# Patient Record
Sex: FEMALE | Race: WHITE | Employment: FULL TIME | URBAN - METROPOLITAN AREA
[De-identification: names, ages, dates, MRNs, and addresses within clinical notes are randomized per-mention and may not be internally consistent; named-entity substitution may affect disease eponyms.]

---

## 2017-06-13 PROBLEM — N91.2 AMENORRHEA: Status: ACTIVE | Noted: 2017-06-13

## 2017-07-18 PROBLEM — E23.0 HYPOGONADOTROPIC HYPOGONADISM (HCC): Status: ACTIVE | Noted: 2017-07-18

## 2019-05-18 ENCOUNTER — HOSPITAL ENCOUNTER (EMERGENCY)
Age: 39
Discharge: ANOTHER HOSPITAL | End: 2019-05-18
Attending: EMERGENCY MEDICINE
Payer: COMMERCIAL

## 2019-05-18 ENCOUNTER — APPOINTMENT (OUTPATIENT)
Dept: GENERAL RADIOLOGY | Age: 39
End: 2019-05-18
Payer: COMMERCIAL

## 2019-05-18 ENCOUNTER — APPOINTMENT (OUTPATIENT)
Dept: CT IMAGING | Age: 39
End: 2019-05-18
Payer: COMMERCIAL

## 2019-05-18 ENCOUNTER — APPOINTMENT (OUTPATIENT)
Dept: CT IMAGING | Age: 39
DRG: 044 | End: 2019-05-18
Payer: COMMERCIAL

## 2019-05-18 ENCOUNTER — HOSPITAL ENCOUNTER (OUTPATIENT)
Age: 39
Discharge: HOME | End: 2019-05-18
Payer: COMMERCIAL

## 2019-05-18 ENCOUNTER — HOSPITAL ENCOUNTER (INPATIENT)
Age: 39
LOS: 3 days | Discharge: PSYCHIATRIC OR SUBSTANCE USE FACILITY | DRG: 044 | End: 2019-05-21
Attending: EMERGENCY MEDICINE | Admitting: INTERNAL MEDICINE
Payer: COMMERCIAL

## 2019-05-18 VITALS
OXYGEN SATURATION: 95 % | RESPIRATION RATE: 16 BRPM | HEART RATE: 89 BPM | TEMPERATURE: 98 F | SYSTOLIC BLOOD PRESSURE: 114 MMHG | DIASTOLIC BLOOD PRESSURE: 78 MMHG

## 2019-05-18 DIAGNOSIS — R41.82 ALTERED MENTAL STATUS, UNSPECIFIED ALTERED MENTAL STATUS TYPE: ICD-10-CM

## 2019-05-18 DIAGNOSIS — J96.01 ACUTE RESPIRATORY FAILURE WITH HYPOXIA AND HYPERCAPNIA (HCC): Primary | ICD-10-CM

## 2019-05-18 DIAGNOSIS — T50.904A DRUG OVERDOSE, UNDETERMINED INTENT, INITIAL ENCOUNTER: ICD-10-CM

## 2019-05-18 DIAGNOSIS — J96.02 ACUTE RESPIRATORY FAILURE WITH HYPOXIA AND HYPERCAPNIA (HCC): Primary | ICD-10-CM

## 2019-05-18 DIAGNOSIS — F19.10 DRUG ABUSE (HCC): Primary | ICD-10-CM

## 2019-05-18 DIAGNOSIS — S06.349A TRAUMATIC HEMORRHAGE OF RIGHT CEREBRUM WITH LOSS OF CONSCIOUSNESS, INITIAL ENCOUNTER (HCC): ICD-10-CM

## 2019-05-18 PROBLEM — I61.4 CEREBELLAR HEMORRHAGE, ACUTE (HCC): Status: ACTIVE | Noted: 2019-05-18

## 2019-05-18 LAB
ACETAMINOPHEN LEVEL: <5 MCG/ML (ref 10–30)
AMPHETAMINE SCREEN, URINE: NOT DETECTED
ANION GAP SERPL CALCULATED.3IONS-SCNC: 11 MMOL/L (ref 7–16)
B.E.: -1.1 MMOL/L (ref -3–3)
BARBITURATE SCREEN URINE: NOT DETECTED
BASOPHILS ABSOLUTE: 0.04 E9/L (ref 0–0.2)
BASOPHILS RELATIVE PERCENT: 0.4 % (ref 0–2)
BENZODIAZEPINE SCREEN, URINE: NOT DETECTED
BUN BLDV-MCNC: 8 MG/DL (ref 6–20)
CALCIUM SERPL-MCNC: 9.5 MG/DL (ref 8.6–10.2)
CANNABINOID SCREEN URINE: NOT DETECTED
CHLORIDE BLD-SCNC: 100 MMOL/L (ref 98–107)
CHP ED QC CHECK: NORMAL
CHP ED QC CHECK: NORMAL
CHP ED QC CHECK: YES
CO2: 29 MMOL/L (ref 22–29)
COCAINE METABOLITE SCREEN URINE: POSITIVE
COHB: 3.8 % (ref 0–1.5)
CREAT SERPL-MCNC: 0.6 MG/DL (ref 0.5–1)
CRITICAL: ABNORMAL
DATE ANALYZED: ABNORMAL
DATE OF COLLECTION: ABNORMAL
EKG ATRIAL RATE: 86 BPM
EKG P AXIS: 69 DEGREES
EKG P-R INTERVAL: 132 MS
EKG Q-T INTERVAL: 394 MS
EKG QRS DURATION: 88 MS
EKG QTC CALCULATION (BAZETT): 471 MS
EKG R AXIS: 83 DEGREES
EKG T AXIS: 50 DEGREES
EKG VENTRICULAR RATE: 86 BPM
EOSINOPHILS ABSOLUTE: 0.21 E9/L (ref 0.05–0.5)
EOSINOPHILS RELATIVE PERCENT: 2.1 % (ref 0–6)
ETHANOL: <10 MG/DL (ref 0–0.08)
GFR AFRICAN AMERICAN: >60
GFR NON-AFRICAN AMERICAN: >60 ML/MIN/1.73
GLUCOSE BLD-MCNC: 185 MG/DL
GLUCOSE BLD-MCNC: 67 MG/DL (ref 74–99)
GLUCOSE BLD-MCNC: 86 MG/DL
GLUCOSE BLD-MCNC: 99 MG/DL
HCG, URINE, POC: NEGATIVE
HCO3: 26 MMOL/L (ref 22–26)
HCT VFR BLD CALC: 41 % (ref 34–48)
HEMOGLOBIN: 13.8 G/DL (ref 11.5–15.5)
HHB: 1.3 % (ref 0–5)
IMMATURE GRANULOCYTES #: 0.03 E9/L
IMMATURE GRANULOCYTES %: 0.3 % (ref 0–5)
LAB: ABNORMAL
LYMPHOCYTES ABSOLUTE: 4.13 E9/L (ref 1.5–4)
LYMPHOCYTES RELATIVE PERCENT: 41.7 % (ref 20–42)
Lab: ABNORMAL
Lab: NORMAL
MCH RBC QN AUTO: 31.7 PG (ref 26–35)
MCHC RBC AUTO-ENTMCNC: 33.7 % (ref 32–34.5)
MCV RBC AUTO: 94 FL (ref 80–99.9)
METER GLUCOSE: 185 MG/DL (ref 74–99)
METER GLUCOSE: 86 MG/DL (ref 74–99)
METER GLUCOSE: 99 MG/DL (ref 74–99)
METHADONE SCREEN, URINE: NOT DETECTED
METHB: 0.3 % (ref 0–1.5)
MODE: ABNORMAL
MONOCYTES ABSOLUTE: 0.75 E9/L (ref 0.1–0.95)
MONOCYTES RELATIVE PERCENT: 7.6 % (ref 2–12)
NEGATIVE QC PASS/FAIL: NORMAL
NEUTROPHILS ABSOLUTE: 4.75 E9/L (ref 1.8–7.3)
NEUTROPHILS RELATIVE PERCENT: 47.9 % (ref 43–80)
O2 CONTENT: 18.4 ML/DL
O2 SATURATION: 98.6 % (ref 92–98.5)
O2HB: 94.6 % (ref 94–97)
OPERATOR ID: 5721
OPIATE SCREEN URINE: NOT DETECTED
PATIENT TEMP: 37 C
PCO2: 53.2 MMHG (ref 35–45)
PDW BLD-RTO: 12.7 FL (ref 11.5–15)
PH BLOOD GAS: 7.31 (ref 7.35–7.45)
PHENCYCLIDINE SCREEN URINE: NOT DETECTED
PLATELET # BLD: 360 E9/L (ref 130–450)
PMV BLD AUTO: 9.5 FL (ref 7–12)
PO2: 174.2 MMHG (ref 60–100)
POSITIVE QC PASS/FAIL: NORMAL
POTASSIUM SERPL-SCNC: 3.8 MMOL/L (ref 3.5–5)
PROPOXYPHENE SCREEN: NOT DETECTED
RBC # BLD: 4.36 E12/L (ref 3.5–5.5)
SALICYLATE, SERUM: <0.3 MG/DL (ref 0–30)
SODIUM BLD-SCNC: 140 MMOL/L (ref 132–146)
SOURCE, BLOOD GAS: ABNORMAL
THB: 13.6 G/DL (ref 11.5–16.5)
TIME ANALYZED: 1034
TOTAL CK: 129 U/L (ref 20–180)
TRICYCLIC ANTIDEPRESSANTS SCREEN SERUM: NEGATIVE NG/ML
TROPONIN: <0.01 NG/ML (ref 0–0.03)
WBC # BLD: 9.9 E9/L (ref 4.5–11.5)

## 2019-05-18 PROCEDURE — A0426 ALS 1: HCPCS

## 2019-05-18 PROCEDURE — 72125 CT NECK SPINE W/O DYE: CPT

## 2019-05-18 PROCEDURE — G0480 DRUG TEST DEF 1-7 CLASSES: HCPCS

## 2019-05-18 PROCEDURE — 99285 EMERGENCY DEPT VISIT HI MDM: CPT

## 2019-05-18 PROCEDURE — 36415 COLL VENOUS BLD VENIPUNCTURE: CPT

## 2019-05-18 PROCEDURE — 80048 BASIC METABOLIC PNL TOTAL CA: CPT

## 2019-05-18 PROCEDURE — 84484 ASSAY OF TROPONIN QUANT: CPT

## 2019-05-18 PROCEDURE — 2580000003 HC RX 258: Performed by: EMERGENCY MEDICINE

## 2019-05-18 PROCEDURE — 93005 ELECTROCARDIOGRAM TRACING: CPT | Performed by: STUDENT IN AN ORGANIZED HEALTH CARE EDUCATION/TRAINING PROGRAM

## 2019-05-18 PROCEDURE — 85025 COMPLETE CBC W/AUTO DIFF WBC: CPT

## 2019-05-18 PROCEDURE — 82962 GLUCOSE BLOOD TEST: CPT

## 2019-05-18 PROCEDURE — 87081 CULTURE SCREEN ONLY: CPT

## 2019-05-18 PROCEDURE — 80307 DRUG TEST PRSMV CHEM ANLYZR: CPT

## 2019-05-18 PROCEDURE — 2580000003 HC RX 258: Performed by: NURSE PRACTITIONER

## 2019-05-18 PROCEDURE — 82550 ASSAY OF CK (CPK): CPT

## 2019-05-18 PROCEDURE — 71045 X-RAY EXAM CHEST 1 VIEW: CPT

## 2019-05-18 PROCEDURE — 70450 CT HEAD/BRAIN W/O DYE: CPT

## 2019-05-18 PROCEDURE — A0425 GROUND MILEAGE: HCPCS

## 2019-05-18 PROCEDURE — 93010 ELECTROCARDIOGRAM REPORT: CPT | Performed by: INTERNAL MEDICINE

## 2019-05-18 PROCEDURE — 93005 ELECTROCARDIOGRAM TRACING: CPT | Performed by: EMERGENCY MEDICINE

## 2019-05-18 PROCEDURE — 6360000002 HC RX W HCPCS: Performed by: NURSE PRACTITIONER

## 2019-05-18 PROCEDURE — 99222 1ST HOSP IP/OBS MODERATE 55: CPT | Performed by: NEUROLOGICAL SURGERY

## 2019-05-18 PROCEDURE — 2580000003 HC RX 258: Performed by: STUDENT IN AN ORGANIZED HEALTH CARE EDUCATION/TRAINING PROGRAM

## 2019-05-18 PROCEDURE — 2060000000 HC ICU INTERMEDIATE R&B

## 2019-05-18 PROCEDURE — 6360000002 HC RX W HCPCS: Performed by: STUDENT IN AN ORGANIZED HEALTH CARE EDUCATION/TRAINING PROGRAM

## 2019-05-18 PROCEDURE — 96374 THER/PROPH/DIAG INJ IV PUSH: CPT

## 2019-05-18 PROCEDURE — 96375 TX/PRO/DX INJ NEW DRUG ADDON: CPT

## 2019-05-18 PROCEDURE — 2500000003 HC RX 250 WO HCPCS: Performed by: NURSE PRACTITIONER

## 2019-05-18 PROCEDURE — 82805 BLOOD GASES W/O2 SATURATION: CPT

## 2019-05-18 RX ORDER — LORAZEPAM 2 MG/ML
1 INJECTION INTRAMUSCULAR ONCE
Status: COMPLETED | OUTPATIENT
Start: 2019-05-18 | End: 2019-05-18

## 2019-05-18 RX ORDER — ALBUTEROL SULFATE 2.5 MG/3ML
2.5 SOLUTION RESPIRATORY (INHALATION) EVERY 6 HOURS PRN
Status: DISCONTINUED | OUTPATIENT
Start: 2019-05-18 | End: 2019-05-21 | Stop reason: HOSPADM

## 2019-05-18 RX ORDER — NALOXONE HYDROCHLORIDE 0.4 MG/ML
0.4 INJECTION, SOLUTION INTRAMUSCULAR; INTRAVENOUS; SUBCUTANEOUS ONCE
Status: DISCONTINUED | OUTPATIENT
Start: 2019-05-18 | End: 2019-05-18

## 2019-05-18 RX ORDER — LABETALOL HYDROCHLORIDE 5 MG/ML
10 INJECTION, SOLUTION INTRAVENOUS EVERY 10 MIN PRN
Status: DISCONTINUED | OUTPATIENT
Start: 2019-05-18 | End: 2019-05-19

## 2019-05-18 RX ORDER — HYDRALAZINE HYDROCHLORIDE 20 MG/ML
10 INJECTION INTRAMUSCULAR; INTRAVENOUS EVERY 10 MIN PRN
Status: DISCONTINUED | OUTPATIENT
Start: 2019-05-18 | End: 2019-05-19

## 2019-05-18 RX ORDER — 0.9 % SODIUM CHLORIDE 0.9 %
1000 INTRAVENOUS SOLUTION INTRAVENOUS ONCE
Status: COMPLETED | OUTPATIENT
Start: 2019-05-18 | End: 2019-05-18

## 2019-05-18 RX ORDER — ONDANSETRON 2 MG/ML
4 INJECTION INTRAMUSCULAR; INTRAVENOUS EVERY 6 HOURS PRN
Status: DISCONTINUED | OUTPATIENT
Start: 2019-05-18 | End: 2019-05-21 | Stop reason: HOSPADM

## 2019-05-18 RX ORDER — DIPHENHYDRAMINE HYDROCHLORIDE 50 MG/ML
12.5 INJECTION INTRAMUSCULAR; INTRAVENOUS ONCE
Status: COMPLETED | OUTPATIENT
Start: 2019-05-18 | End: 2019-05-18

## 2019-05-18 RX ORDER — SODIUM CHLORIDE 0.9 % (FLUSH) 0.9 %
10 SYRINGE (ML) INJECTION PRN
Status: DISCONTINUED | OUTPATIENT
Start: 2019-05-18 | End: 2019-05-21 | Stop reason: HOSPADM

## 2019-05-18 RX ORDER — DEXTROSE MONOHYDRATE 25 G/50ML
12.5 INJECTION, SOLUTION INTRAVENOUS ONCE
Status: COMPLETED | OUTPATIENT
Start: 2019-05-18 | End: 2019-05-18

## 2019-05-18 RX ORDER — SODIUM CHLORIDE 0.9 % (FLUSH) 0.9 %
10 SYRINGE (ML) INJECTION EVERY 12 HOURS SCHEDULED
Status: DISCONTINUED | OUTPATIENT
Start: 2019-05-18 | End: 2019-05-21 | Stop reason: HOSPADM

## 2019-05-18 RX ADMIN — SODIUM CHLORIDE 1000 ML: 9 INJECTION, SOLUTION INTRAVENOUS at 07:35

## 2019-05-18 RX ADMIN — THIAMINE HYDROCHLORIDE: 100 INJECTION, SOLUTION INTRAMUSCULAR; INTRAVENOUS at 12:45

## 2019-05-18 RX ADMIN — LORAZEPAM 1 MG: 2 INJECTION INTRAMUSCULAR; INTRAVENOUS at 07:35

## 2019-05-18 RX ADMIN — SODIUM CHLORIDE 1000 ML: 9 INJECTION, SOLUTION INTRAVENOUS at 11:36

## 2019-05-18 RX ADMIN — DEXTROSE 50 % IN WATER (D50W) INTRAVENOUS SYRINGE 12.5 G: at 08:39

## 2019-05-18 RX ADMIN — DIPHENHYDRAMINE HYDROCHLORIDE 12.5 MG: 50 INJECTION, SOLUTION INTRAMUSCULAR; INTRAVENOUS at 07:35

## 2019-05-18 ASSESSMENT — PAIN SCALES - GENERAL
PAINLEVEL_OUTOF10: 0

## 2019-05-18 NOTE — ED PROVIDER NOTES
[id number]      Department of Emergency Medicine   ED  Provider Note  Admit Date/RoomTime: 5/18/2019  9:37 AM  ED Room: 23/23          History of Present Illness:  5/18/19, Time: 9:54 AM         Ivory Centeno is a 45 y.o. female presenting to the ED for possible overdose and intracranial hemorrhage, beginning prior to arrival.  The complaint has been persistent, moderate in severity, and worsened by nothing. The patient presented to [facility] after a possible overdose. She was unresponsive. Testing showed UDS + for cocaine. CT head showed cerebellar hemorrhage. No trauma. Transferred here for further evaluation. She was given benadryl and ativan prior to arrival and prior to transfer. On arrival she is sleepy, moans and opens eyes to painful stimuli and moving all extremities to painful stimuli. Review of Systems:   Pertinent positives and negatives are stated within HPI, all other systems reviewed and are negative.        --------------------------------------------- PAST HISTORY ---------------------------------------------  Past Medical History:  has a past medical history of Asthma and Thyroid disease. Past Surgical History:  has a past surgical history that includes Dental surgery (5/2/2012); cyst removal; and Dilation and curettage of uterus. Social History:  reports that she has been smoking cigarettes. She has been smoking about 1.00 pack per day. She does not have any smokeless tobacco history on file. She reports that she drinks alcohol. She reports that she does not use drugs. Family History: family history is not on file. The patients home medications have been reviewed. Allergies: Patient has no known allergies. ---------------------------------------------------PHYSICAL EXAM--------------------------------------    Constitutional/General: sedated, opens eyes to painful stimuli.    Head: Normocephalic and atraumatic  Eyes: PERRL, EOMI, conjunctiva normal, sclera non Glucose   05/18/19 08:56:00 05/18/19 08:59:00 185High    Previous Results   05/18/19 07:30:00 05/18/19 07:33:00 99       Final result                        POCT Glucose (Final result)    Component (Lab Inquiry)   Collection Time Result Time GLUCOSE QC OK?    05/18/19 07:30:00 99 ok   Previous Results   05/18/19 07:17:00 05/18/19 07:54:00 67Low           Final result                          POCT Glucose (Final result)    Component (Lab Inquiry)   Collection Time Result Time Meter Glucose   05/18/19 07:30:00 05/18/19 07:33:00 99         Final result                          Contains abnormal data CBC Auto Differential (Final result)    Component (Lab Inquiry)   Collection Time Result Time WBC RBC HGB HCT MCV MCH MCHC RDW PLT MPV   05/18/19 07:17:00 05/18/19 07:31:00 9.9 4.36 13.8 41.0 94.0 31.7 33.7 12.7 360 9.5       Collection Time Result Time NEUTRO PCT Immature Granulocytes % LYMPHO PCT MONO PCT EOS BASOS PCT NEUTRO ABS Immature Granulocytes # LYMPHS ABS MONOS ABS   05/18/19 07:17:00 05/18/19 07:31:00 47.9 0.3 41.7 7.6 2.1 0.4 4.75 0.03 4. 13High  0.75       Collection Time Result Time EOS ABS BASOS ABS   05/18/19 07:17:00 05/18/19 07:31:00 0.21 0.04       Final result                        Contains abnormal data Basic Metabolic Panel (Final result)    Component (Lab Inquiry)   Collection Time Result Time NA K CL CO2 ANION GAP GLUCOSE BUN CREATININE GFR Non- GFR    05/18/19 07:17:00 05/18/19 07:54:00 140 3.8 100 29 11 67Low  8 0.6 >60  Chronic Kidney Disease. .. >60       Collection Time Result Time CALCIUM   05/18/19 07:17:00 05/18/19 07:54:00 9.5       Final result                        Troponin (Final result)    Component (Lab Inquiry)   Collection Time Result Time Troponin   05/18/19 07:17:00 05/18/19 07:54:00 <0.01  TROPONIN T BLOOD LEVEL. ..         Final result                          Contains abnormal data Urine Drug Screen (Final result)    Component (Lab Inquiry) Collection Time Result Time Amphetamine Screen, Urine Barbiturate Screen, Ur Benzodiazepine Screen, Urine Cannabinoid Scrn, Ur Cocaine Metabolite Screen, Urine Opiate Scrn, Ur PCP Screen, Urine Methadone Screen, Urine Propoxyphene Scrn, Ur   19 07:17:00 19 08:23:00 NOT DETECTED NOT DETECTED NOT DETECTED NOT DETECTED POSITIVEAbnormal  NOT DETECTED  Note: The Opiate Scre. .. NOT DETECTED  A urine drug screen as. .. NOT DETECTED NOT DETECTED       Final result                        Contains abnormal data Serum Drug Screen (Preliminary result)    Component (Lab Inquiry)   Collection Time Result Time Ethanol Lvl ACTMNPHEN Salicylate, Serum   39/ 07:17:00 19 07:52:00 <10  Not Detected <5.0Low  <0.3           POC Pregnancy Urine Qual (Final result)    Component (Lab Inquiry)   Collection Time Result Time pochcgu lotnum posqcpassfail negqcpassfail   19 07:14:00 19 07:14:00 Negative [id number] Pass Pass         Final result                      Micro Results This Visit     None   Imaging Results         XR CHEST PORTABLE (Final result)   Result time 19 09:29:42   Final result by Gee Linares MD (19 09:29:42)                Impression:    normal chest                    Narrative:    Patient MRN: [id number]  : 1980  Age:  40 years  Gender: Female  Order Date: 2019 7:00 AM  Exam: XR CHEST PORTABLE  Number of Images: 1 view  Indication:   altered mental status, suspected drug overdose   altered mental status, suspected drug overdose  Comparison: None. Findings: The heart is unremarkable. The lung fields are unremarkable. The aorta is unremarkable.                    CT Cervical Spine WO Contrast (Final result)   Result time 19 09:11:58   Final result by Suman Lugo MD (19 09:11:58)                Impression:    Spondylotic change at the C5-C6 interspace level.   Otherwise normal.            Narrative:    Patient MRN:  [id number]  : 1980  Age: 45 years  Gender: Female  Order Date:  2019 8:30 AM  EXAM: CT CERVICAL SPINE WO CONTRAST  NUMBER OF IMAGES: 590  INDICATION:  altered mental status   COMPARISON: None    Multiple CT sections were obtained with sagittal and coronal MPR  reconstructions. Technique: Low-dose CT  acquisition technique included one of  following options; 1 . Automated exposure control, 2. Adjustment of MA  and or KV according to patient's size or 3. Use of iterative  reconstruction. Comparison: None. FINDINGS: There is no evidence of fracture or subluxation. No  suspicious bony lesion is noted. The craniocervical junction and the  C1-C2 articulation normal.    At the C5-C6 interspace level, mild to moderate disc space narrowing  is seen. Changes of mild diffuse concentric disc bulging ridging are  noted, slightly compromising the spinal canal and neural foramina;  exacerbation by minimal bilateral uncinate joint hypertrophy is noted. Posterior elements are normal.    Other disc spaces are preserved in height spinal canal is otherwise  widely patent. No regional hematoma, enlarged lymph node, or other abnormality can be  identified. Visualized portions of the lung apices appear clear. .                    CT Head WO Contrast (Final result)   Result time 19 08:18:51   Final result by Indiana Higginbotham MD (19 08:18:51)                Impression:    Findings suspicious for a small hemorrhage at the level  the right cerebellum  ALERT: Sergio Raw    Results were called to Dr. Savannah Guevara at the time of dictation            Narrative:    Patient MRN:  [id number]  : 1980  Age: 45 years  Gender: Female  Order Date:  2019 7:00 AM  EXAM: CT HEAD WO CONTRAST  NUMBER OF IMAGES:  156  INDICATION:  altered mental status, suspected drug overdose   COMPARISON: None    Technique: Low-dose CT  acquisition technique included one of  following options; 1 . Automated exposure control, 2.  Adjustment of present              Bambi Halsted, MD  05/18/19 1125
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
## 2019-05-18 NOTE — ED NOTES
Bed: 23  Expected date:   Expected time:   Means of arrival:   Comments:     Lisandra Huerta RN  05/18/19 9424
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
## 2019-05-18 NOTE — H&P
Hospital Medicine History & Physical      PCP: Rhea Almaraz MD    Date of Admission: 5/18/2019    Date of Service: Pt seen/examined on 5/18/2019 and Admitted to Inpatient with expected LOS greater than two midnights due to medical therapy. Chief Complaint: AMS    History Of Present Illness:      Pt with a hx of asthma and thyroid disease who initially presented to [facility] ED for AMS following suspected overdose. Pt UDS showed cocaine and CT head showed cerebellar bleed. Pt became agitated and given benadryl and ativan and then transferred to our ED. Pt was sleepy but does awake to sternal rub. Unclear if patient took additional mediation with cocaine. Pt admitted to NSICU for further observation. Past Medical History:          Diagnosis Date    Asthma     Thyroid disease        Past Surgical History:          Procedure Laterality Date    CYST REMOVAL      on thyroid    DENTAL SURGERY  5/2/2012    upper teeth extraction    DILATION AND CURETTAGE OF UTERUS         Medications Prior to Admission:      Prior to Admission medications    Medication Sig Start Date End Date Taking? Authorizing Provider   albuterol (PROVENTIL) (2.5 MG/3ML) 0.083% nebulizer solution Take 3 mLs by nebulization every 6 hours as needed for Wheezing 11/20/15   GIRMA Merchant CNP   Respiratory Therapy Supplies (NEBULIZER/TUBING/MOUTHPIECE) KIT 1 kit by Does not apply route daily as needed 11/20/15   GIRMA Merchant CNP   amphetamine-dextroamphetamine (ADDERALL XR) 10 MG XR capsule Take 20 mg by mouth 2 times daily. Historical Provider, MD   Zolpidem Tartrate (AMBIEN PO) Take  by mouth. Historical Provider, MD   alprazolam Ahmed Cordon) 0.25 MG tablet Take 1 mg by mouth 3 times daily as needed. Historical Provider, MD   BuPROPion HCl (WELLBUTRIN SR PO) Take 200 mg by mouth 2 times daily. Historical Provider, MD       Allergies:  Patient has no known allergies.     Social History:      The patient currently lives at home    TOBACCO:   reports that she has been smoking cigarettes. She has been smoking about 1.00 pack per day. She does not have any smokeless tobacco history on file. ETOH:   reports that she drinks alcohol. Family History:      Reviewed in detail and negative for DM, CAD, Cancer, CVA. Positive as follows:    History reviewed. No pertinent family history. REVIEW OF SYSTEMS:   Pertinent positives as noted in the HPI. All other systems reviewed and negative. PHYSICAL EXAM:    BP 96/63   Pulse 68   Temp 98.3 °F (36.8 °C) (Oral)   Resp 15   Ht 5' 6\" (1.676 m)   Wt 115 lb (52.2 kg)   SpO2 100%   BMI 18.56 kg/m²     General appearance:  Sleepy but arousable with sternal rub. HEENT:  Normal cephalic, atraumatic without obvious deformity. Pupils equal, round, and reactive to light. Extra ocular muscles intact. Conjunctivae/corneas clear. Neck: Supple, with full range of motion. No jugular venous distention. Trachea midline. Respiratory:  Normal respiratory effort. Clear to auscultation, bilaterally without Rales/Wheezes/Rhonchi. Cardiovascular:  Regular rate and rhythm with normal S1/S2 without murmurs, rubs or gallops. Abdomen: Soft, non-tender, non-distended with normal bowel sounds. Musculoskeletal:  No clubbing, cyanosis or edema bilaterally. Full range of motion without deformity. Skin: Skin color, texture, turgor normal.  No rashes or lesions. Neurologic:  Sleepy, arousable. Oriented x 2    Labs:     No results for input(s): WBC, HGB, HCT, PLT in the last 72 hours. No results for input(s): NA, K, CL, CO2, BUN, CREATININE, CALCIUM, PHOS in the last 72 hours. Invalid input(s): MAGNES  No results for input(s): AST, ALT, BILIDIR, BILITOT, ALKPHOS in the last 72 hours. No results for input(s): INR in the last 72 hours.   Recent Labs     05/18/19  1005   CKTOTAL 129       Urinalysis:      Lab Results   Component Value Date    NITRU NEGATIVE 08/05/2011    WBCUA >20 08/05/2011 BACTERIA MODERATE 08/05/2011    RBCUA NONE 08/05/2011    BLOODU NEGATIVE 08/05/2011    SPECGRAV >=1.030 08/05/2011    GLUCOSEU NEGATIVE 08/05/2011         ASSESSMENT:  Acute toxic encephalopathy  Cerebellar hemorrhage  Cocaine use    PLAN:  Repeat head CT, consult NSG  BP control <   Watch respiratory status closely  NPO until more alert for diet  Pain control    DVT Prophylaxis: SCD  Diet: DIET GENERAL;  Code Status: Full Code    PT/OT Eval Status: Jhonny Mcgregor MD    Thank you Glenda Cruz MD for the opportunity to be involved in this patient's care. If you have any questions or concerns please feel free to contact me at [phone].

## 2019-05-18 NOTE — PROGRESS NOTES
Not on file    Food insecurity:     Worry: Not on file     Inability: Not on file    Transportation needs:     Medical: Not on file     Non-medical: Not on file   Tobacco Use    Smoking status: Current Some Day Smoker     Packs/day: 1.00     Types: Cigarettes   Substance and Sexual Activity    Alcohol use: Yes     Comment: rare    Drug use: No    Sexual activity: Not on file   Lifestyle    Physical activity:     Days per week: Not on file     Minutes per session: Not on file    Stress: Not on file   Relationships    Social connections:     Talks on phone: Not on file     Gets together: Not on file     Attends Faith service: Not on file     Active member of club or organization: Not on file     Attends meetings of clubs or organizations: Not on file     Relationship status: Not on file    Intimate partner violence:     Fear of current or ex partner: Not on file     Emotionally abused: Not on file     Physically abused: Not on file     Forced sexual activity: Not on file   Other Topics Concern    Not on file   Social History Narrative    Not on file       Family History:   History reviewed. No pertinent family history. VITAL SIGNS:   /67   Pulse 70   Temp 98.3 °F (36.8 °C) (Oral)   Resp 23   Ht 5' 6\" (1.676 m)   Wt 115 lb (52.2 kg)   SpO2 100%   BMI 18.56 kg/m²     PHYSICAL EXAM:    General appearance:  Comfortable. Pain Description: none  GCS:    2 - Opens eyes with pain   6 - Follows simple motor commands  4 - Seems confused, disoriented    Pupil size:  Left 3 mm    Right 3 mm  Pupil reaction: Yes  Wiggles fingers: Left Yes Right Yes  Hand grasp:   Left decreased    Right decreased  Wiggles toes: Left Yes    Right Yes  Plantar flexion: Left decreased   Right decreased    CONSTITUTIONAL: no acute distress, lying in hospital bed. Lethargic, catechetic   CARDIOVASCULAR: S1 S2, regular rate, regular rhythm, no murmur/gallop/rub.  Monitor:sinus  PULMONARY: no rhonchi/rales/wheezes, no use of accessory muscles  RENAL: fischer to gravity, clear yellow urine  ABDOMEN: soft, nontender, nondistended, nontympanic, no masses, no organomegaly, normal bowel sounds   MUSCULOSKELETAL: moves all extremities purposefully  SKIN/EXTREMITIES: no rashes/ecchymosis, no edema/clubbing, warm/dry, good capillary refill     Assessment & Plan:       · Neuro:  Suspected ICH, Cocaine use, Hx substance abuse, anxiety. Neurosurgery following, repeat CT scan, monitor neuro status, monitor for withdrawal   · CV: No acute issues. Monitor hemodynamics. BP goal < 140. PRN hydralzine & labetalol. · Pulm: High risk for espiratory insufficiency, Hx asthma. Monitor RR & SpO2. Albuterol. · GI: Protein calorie malnutrition. NPO until more alert for swallow eval.  Monitor bowel function. Dietitian eval, IV MVI  · Renal: No acute issues. Monitor BUN & Cr. Monitor electrolytes & replace as needed. Monitor urine output. · ID: No acute issues   · Endocrine: No acute issues. Hx  Hypothyroid. Check TSH  · MSK: No acute issues. · Heme: No acute issues. Anemia. Monitor CBC. Bowel regime:  MOM   Pain control/Sedation:  Tylenol  DVT prophylaxis:  Lovenox/heparin. No Lovenox/heparin until ok with neurosurgery. Mouth/Eye care: As needed  Fischer: Keep in place for critical care monitoring of fluid balance.     Ancillary consults: Neurosurgery, Medicine   Family update: Updated at bedside   Code status:  Full    Disposition:  NSICU    Electronically signed by GIRMA Malloy CNP on 5/18/2019 at 1:17 PM

## 2019-05-18 NOTE — CONSULTS
[address]                  [address] [facility]                                  CONSULTATION    PATIENT NAME: Nguyen Malik                   :        1980  MED REC NO:   [id number]                            ROOM:       4585  ACCOUNT NO:   [de-identified]                           ADMIT DATE: 2019  PROVIDER:     José Antonio Ramos MD    CONSULT DATE:  2019    REASON FOR CONSULT:  1. Cerebellar hemorrhage. 2.  Possible cerebellar calcification. HISTORY OF PRESENT ILLNESS:  The patient is a 35-year-old lady who  presented to the emergency room at [address] [place] [address] after being  found in a car at a gas station with slow respiration and decreased  responsiveness. She was given Narcan and was subsequently taken to the  hospital.  She was diagnosed with an intentional overdose as there was a  pill bottle found in the car with her. She was ultimately transported  to [facility] in [facility] after she had a CT scan of her  head that showed possible cerebellar hemorrhage. Neurosurgery service  was consulted. PAST MEDICAL HISTORY:  Unknown. PAST SURGICAL HISTORY:  Unknown. FAMILY HISTORY:  Unknown. SOCIAL HISTORY:  Unknown. ALLERGIES:  Unknown. REVIEW OF SYSTEMS:  I am unable to complete a 14-point review of systems  because of the patient's current medical condition. PHYSICAL EXAMINATION:  VITAL SIGNS:  She is currently afebrile with a T-current of 36.7 degrees  Celsius, respiratory rate is 18, pulse 88, blood pressure is 112/68. GENERAL:  She is resting in bed. She is sleepy, but arousable with the  application of noxious stimuli. NEUROLOGIC:  Again, due to the fact that she is still pharmacologically  inhibited as a result of her attempted overdose, rest of the examination  is difficult to complete.   With the application of noxious stimuli, she  will localize in her bilateral upper

## 2019-05-18 NOTE — ED NOTES
Bed: 28  Expected date:   Expected time:   Means of arrival:   Comments:  EMS overdose     Cory Isaac [facility]  05/18/19 0254

## 2019-05-19 LAB
ANION GAP SERPL CALCULATED.3IONS-SCNC: 10 MMOL/L (ref 7–16)
BUN BLDV-MCNC: 9 MG/DL (ref 6–20)
CALCIUM SERPL-MCNC: 9.3 MG/DL (ref 8.6–10.2)
CHLORIDE BLD-SCNC: 104 MMOL/L (ref 98–107)
CO2: 24 MMOL/L (ref 22–29)
CREAT SERPL-MCNC: 0.7 MG/DL (ref 0.5–1)
EKG ATRIAL RATE: 85 BPM
EKG P AXIS: 70 DEGREES
EKG P-R INTERVAL: 124 MS
EKG Q-T INTERVAL: 398 MS
EKG QRS DURATION: 88 MS
EKG QTC CALCULATION (BAZETT): 473 MS
EKG R AXIS: 82 DEGREES
EKG T AXIS: 57 DEGREES
EKG VENTRICULAR RATE: 85 BPM
GFR AFRICAN AMERICAN: >60
GFR NON-AFRICAN AMERICAN: >60 ML/MIN/1.73
GLUCOSE BLD-MCNC: 84 MG/DL (ref 74–99)
HCT VFR BLD CALC: 40 % (ref 34–48)
HEMOGLOBIN: 13.1 G/DL (ref 11.5–15.5)
MAGNESIUM: 2 MG/DL (ref 1.6–2.6)
MCH RBC QN AUTO: 31 PG (ref 26–35)
MCHC RBC AUTO-ENTMCNC: 32.8 % (ref 32–34.5)
MCV RBC AUTO: 94.6 FL (ref 80–99.9)
MRSA CULTURE ONLY: NORMAL
PDW BLD-RTO: 13.4 FL (ref 11.5–15)
PHOSPHORUS: 3.4 MG/DL (ref 2.5–4.5)
PLATELET # BLD: 329 E9/L (ref 130–450)
PMV BLD AUTO: 9.9 FL (ref 7–12)
POTASSIUM SERPL-SCNC: 3.8 MMOL/L (ref 3.5–5)
RBC # BLD: 4.23 E12/L (ref 3.5–5.5)
SODIUM BLD-SCNC: 138 MMOL/L (ref 132–146)
TOTAL CK: 83 U/L (ref 20–180)
WBC # BLD: 7 E9/L (ref 4.5–11.5)

## 2019-05-19 PROCEDURE — 84100 ASSAY OF PHOSPHORUS: CPT

## 2019-05-19 PROCEDURE — 2060000000 HC ICU INTERMEDIATE R&B

## 2019-05-19 PROCEDURE — 83735 ASSAY OF MAGNESIUM: CPT

## 2019-05-19 PROCEDURE — 82550 ASSAY OF CK (CPK): CPT

## 2019-05-19 PROCEDURE — 80048 BASIC METABOLIC PNL TOTAL CA: CPT

## 2019-05-19 PROCEDURE — 97161 PT EVAL LOW COMPLEX 20 MIN: CPT

## 2019-05-19 PROCEDURE — 97165 OT EVAL LOW COMPLEX 30 MIN: CPT

## 2019-05-19 PROCEDURE — 2580000003 HC RX 258: Performed by: INTERNAL MEDICINE

## 2019-05-19 PROCEDURE — 36415 COLL VENOUS BLD VENIPUNCTURE: CPT

## 2019-05-19 PROCEDURE — 85027 COMPLETE CBC AUTOMATED: CPT

## 2019-05-19 PROCEDURE — 97535 SELF CARE MNGMENT TRAINING: CPT

## 2019-05-19 PROCEDURE — 6370000000 HC RX 637 (ALT 250 FOR IP): Performed by: INTERNAL MEDICINE

## 2019-05-19 PROCEDURE — 99232 SBSQ HOSP IP/OBS MODERATE 35: CPT | Performed by: NEUROLOGICAL SURGERY

## 2019-05-19 RX ORDER — HYDRALAZINE HYDROCHLORIDE 20 MG/ML
10 INJECTION INTRAMUSCULAR; INTRAVENOUS EVERY 4 HOURS PRN
Status: DISCONTINUED | OUTPATIENT
Start: 2019-05-19 | End: 2019-05-21 | Stop reason: HOSPADM

## 2019-05-19 RX ORDER — ALBUTEROL SULFATE 2.5 MG/3ML
2.5 SOLUTION RESPIRATORY (INHALATION) EVERY 6 HOURS PRN
Status: CANCELLED | OUTPATIENT
Start: 2019-05-19

## 2019-05-19 RX ORDER — LABETALOL HYDROCHLORIDE 5 MG/ML
10 INJECTION, SOLUTION INTRAVENOUS EVERY 4 HOURS PRN
Status: DISCONTINUED | OUTPATIENT
Start: 2019-05-19 | End: 2019-05-21 | Stop reason: HOSPADM

## 2019-05-19 RX ORDER — HYDROXYZINE PAMOATE 25 MG/1
25 CAPSULE ORAL EVERY 4 HOURS PRN
Status: DISCONTINUED | OUTPATIENT
Start: 2019-05-19 | End: 2019-05-21 | Stop reason: HOSPADM

## 2019-05-19 RX ADMIN — HYDROXYZINE PAMOATE 25 MG: 25 CAPSULE ORAL at 19:28

## 2019-05-19 RX ADMIN — Medication 10 ML: at 19:28

## 2019-05-19 RX ADMIN — Medication 10 ML: at 08:38

## 2019-05-19 ASSESSMENT — PAIN SCALES - GENERAL
PAINLEVEL_OUTOF10: 0

## 2019-05-19 NOTE — PLAN OF CARE
Problem: Inadequate oral food/beverage intake (NI-2.1)  Goal: Food and/or Nutrient Delivery  Magic Cup BID  Description  Individualized approach for food/nutrient provision.   Outcome: Met This Shift

## 2019-05-19 NOTE — PROGRESS NOTES
Occupational Therapy  OCCUPATIONAL THERAPY INITIAL EVALUATION      Date:2019  Patient Name: Annemarie Last  MRN: [id number]  : 1980  Room: 8506/850-A     Evaluating OT: Virgilio Lan OTR/L [id number]  AM-PAC Daily Activity Raw Score:   Recommended Adaptive Equipment: TBA    Comments: Based on patient's functional performance as documented below and prior level of function, patient would benefit from continued skilled OT during/following hospital stay in an effort to increase functional safety, independence with ADLS/IADLS, and overall quality of life. Diagnosis: ICH, OD, ARF & hypoxia  Pertinent Medical History: +ETOH, teeth extraction, hx thyroid disease & asthma  Precautions:  Falls & dialysis    Home Living: Pt lives mom & 2 kids 15& 6years old in an apt 2 ARTUR no HR; bed/bath on first floor: tub- curtain & laundry in the basement 8 steps 1HR. Equipment owned:     Prior Level of Function: IND with ADLs; Independent except assist with cleaning with IADLs as needed. No device for ambulation. Driving: yes  Occupation: not working    Pain Level: pt c/o no pain  this session    Cognition: A&O x 3.      Memory: G   Comprehension G   Problem solving: G   Judgement/safety: G-               Communication skills: WFL           Vision: WFL- glasses               Glasses:yes                                                   Hearing: WFL      UE Assessment:  Hand Dominance: Right [x]Left []      ROM Strength    RUE  WFL 5/5                          LUE WFL 5/5         Sensation: No c/o numbness or tingling in extremities   Tone: WNL   Edema: [facility]     Functional Assessment   Initial Eval Status  Date: 19 Treatment Status  Date: Short Term Goals  Treatment frequency: PRN 1-3 tx/wk   Feeding independent     Grooming independent     UB dressing/bathing independent     LB dressing/bathing independent     Toileting independent      Bed Mobility  Supine<>sit: independent         Functional Transfers Sit<>stand: Independent       Functional Mobility independent     Balance Sitting:     Static:  G    Dynamic:G  Standing: G       Endurance/Activity Tolerance   G     Visual/  Perceptual        WFL                         Comments/Treatment: OK from RN to see patient. Upon arrival, patient supine in bed; agreeable to session. Patient performed bed mobility in preparation of self care activities and functional transfers & mobility. Patient required Sup to EOB Pt performed tranfers & mobility using no device & min vc's for safety as she moved throughout  Her environment. Pt educated with regards to energy conservation /pacing strategies to use @ home. At end of session, pt assisted to a chair with arm rests educated on use of call light. All devices within reach, all lines and tubes intact. Eval Complexity: minimal    · History: Expanded chart review of consults, imaging, and psychosocial history related to current functional performance. · Exam: 2+ performance deficits identified limiting functional independence and safe return home   · Assistance/Modification: Min/mod assistance or modifications required to perform tasks. May have comorbidities that affect occupational performance. ·   Pt is functionally independent in all areas & has no OT needs     Patient / Family Goal: \"Clean my house. \"    Patient and/or family were instructed/educated on diagnosis, prognosis/goals and plan of care. Family  demonstrated G understanding. [] Malnutrition indicators have been identified and nursing has been notified to ensure a dietitian consult is ordered.       Minimal Complexity Evaluation + 15 min timed tx    Tx time in: 755   Tx time out: 1670 [facility] OTR/L [id number]

## 2019-05-19 NOTE — CONSULTS
Patient seen, chart reviewed, spoke with nursing staff and mother, who was by the bed. Patient was found in a car at a gas station with slow respiration and decreased responsiveness. She was given Narcan and was subsequently taken to the hospital.  She was diagnosed with an intentional overdose. Patient with hx of depression and was not taking her meds for the past 3 months. Plan:    1. Transfer patient to psych once medically stable. She is pink slipped. Will sign off.

## 2019-05-19 NOTE — PROGRESS NOTES
Department of Neurosurgery  Attending Progress Note    CHIEF COMPLAINT:    SUBJECTIVE:  Seen today for possible cerebellar hemorrhage    ROS:    OBJECTIVE  Physical  VITALS:  BP 98/67   Pulse 72   Temp 97 °F (36.1 °C) (Temporal)   Resp 16   Ht 5' 6\" (1.676 m)   Wt 116 lb 3.2 oz (52.7 kg)   SpO2 96%   BMI 18.76 kg/m²   NEUROLOGIC:  Mental Status Exam:  Level of Alertness:   awake  Orientation:   person, place, time  Motor Exam:  Motor exam is symmetrical 5 out of 5 all extremities bilaterally  Sensory:  Sensory intact    Data  CBC:   Lab Results   Component Value Date    WBC 7.0 05/19/2019    RBC 4.23 05/19/2019    HGB 13.1 05/19/2019    HCT 40.0 05/19/2019    MCV 94.6 05/19/2019    MCH 31.0 05/19/2019    MCHC 32.8 05/19/2019    RDW 13.4 05/19/2019     05/19/2019    MPV 9.9 05/19/2019     BMP:    Lab Results   Component Value Date     07/18/2017    K 4.0 07/18/2017    CL 99 07/18/2017    CO2 30 07/18/2017    BUN 6 07/18/2017    CREATININE 0.7 07/18/2017    CALCIUM 8.9 07/18/2017    GFRAA >60 07/18/2017    LABGLOM >60 07/18/2017    GLUCOSE 86 05/18/2019    GLUCOSE 92 08/05/2011     Current Inpatient Medications  Current Facility-Administered Medications: hydrALAZINE (APRESOLINE) injection 10 mg, 10 mg, Intravenous, Q4H PRN  labetalol (NORMODYNE;TRANDATE) injection 10 mg, 10 mg, Intravenous, Q4H PRN  sodium chloride flush 0.9 % injection 10 mL, 10 mL, Intravenous, 2 times per day  sodium chloride flush 0.9 % injection 10 mL, 10 mL, Intravenous, PRN  ondansetron (ZOFRAN) injection 4 mg, 4 mg, Intravenous, Q6H PRN  magnesium hydroxide (MILK OF MAGNESIA) 400 MG/5ML suspension 30 mL, 30 mL, Oral, Daily PRN  albuterol (PROVENTIL) nebulizer solution 2.5 mg, 2.5 mg, Nebulization, Q6H PRN    ASSESSMENT AND PLAN:    Patient with cerebellar calcification. No evidence of bleed on repeat CT. No intervention. No follow up required.   Will sign off    Abbie Mayer

## 2019-05-19 NOTE — PROGRESS NOTES
Nutrition Assessment    Type and Reason for Visit: Initial, Consult    Nutrition Recommendations: Continue current diet, Start Magic Cup BID    Nutrition Assessment: Pt reports good appetite, stable wt. RD consulted for malnutrition though pt currently at risk for malnutrition, not diagnosable at this time. Will provide ONS as pt at low-normal BMI w/ muscle wasting. Pt not open to complete nutrition assessment at this time    Malnutrition Assessment:  · Malnutrition Status: At risk for malnutrition  · Context: Chronic illness  · Findings of the 6 clinical characteristics of malnutrition (Minimum of 2 out of 6 clinical characteristics is required to make the diagnosis of moderate or severe Protein Calorie Malnutrition based on AND/ASPEN Guidelines):  1. Energy Intake-Greater than 75% of estimated energy requirement, (since adm per pt)    2. Weight Loss-No significant weight loss, (in 2 years)  3. Fat Loss-No significant subcutaneous fat loss    4. Muscle Loss-Moderate muscle mass loss, Temples (temporalis muscle), Clavicles (pectoralis and deltoids), Thigh (quadriceps), Calf (gastrocnemius)  5. Fluid Accumulation-No significant fluid accumulation    6.   Strength-Not measured    Nutrition Risk Level: Low    Nutrient Needs:  · Estimated Daily Total Kcal: (25-30 kcal/kg CBW)  · Estimated Daily Protein (g): 65-75(1.2-1.4 gm/kg CBW)  · Estimated Daily Total Fluid (ml/day):     Nutrition Diagnosis:   · Problem: No nutrition diagnosis at this time    Objective Information:  · Nutrition-Focused Physical Findings: pt alert in bed w/ family at bedside, abd WDL, no noted edema, fluids WNL  · Wound Type: None  · Current Nutrition Therapies:  · Oral Diet Orders: General   · Oral Diet intake: %(per pt)  · Oral Nutrition Supplement (ONS) Orders: None  · Anthropometric Measures:  · Ht: 5' 6\" (167.6 cm)   · Current Body Wt: 116 lb (52.6 kg)(bed scale 5/19)  · Admission Body Wt: 115 lb (52.2 kg)(bed scale

## 2019-05-19 NOTE — PROGRESS NOTES
Physical Therapy  Initial Assessment     Name: Shelby Shafer  : 1980  MRN: [id number]    Date of Service: 2019    Evaluating PT: Jovita Bautista PT, DPTHEODORA [id number]    Room #:  5732/3087-L    Diagnosis: Cerebellar hemorrhage, acute  Precautions: Substance abuse    Pt lives with mother and 2 children (15 and 7 y/o) in a 2 story apartment unit with 2 stair(s) and 0 rail(s) to enter. Bed is on the second floor and bath is on the second floor. Full flight of stairs and 1 rail to second floor. Pt ambulated without AD Independent prior to admission. Initial Evaluation  Date: 19 Treatment Date: Short Term/ Long Term   Goals   AM-PAC 6 Clicks 79/88     Was pt agreeable to Eval/treatment? Yes     Does pt have pain? No current complaints of pain     Bed Mobility  Rolling: Independent   Supine to sit: Independent   Sit to supine: Independent   Scooting: Independent   NA   Transfers Sit to stand: Independent  Stand to sit: Independent   Stand pivot: Independent    NA   Ambulation   100 feet Independent  NA   Stair negotiation: 4 steps with 1 rail(s) Mod Independent   NA   ROM B UE: Refer to OT note  B LE: WFL  NA   Strength B UE: Refer to OT note  B LE: 4+/5  NA   Balance Sitting EOB: Independent   Dynamic standing: Independent  NA     Pt is A & O x: 4 to person, place, month/year, and situation. Sensation: Pt denies numbness and tingling to extremities. B LE light touch sensation intact. Coordination: B UE and LE coordination intact. Edema: Unremarkable. ASSESSMENT    Patient education  Pt educated on PT role in hospital setting. Patient response to education:   Pt verbalized understanding Pt demonstrated skill Pt requires further education in this area   Yes NA No     Comments:  Pt was supine in bed upon room entry, agreeable to PT evaluation. Pt demonstrated independence with all functional mobility noted above. Pt is agreeable that there are no skilled PT needs at this time.  Pt was left supine in bed with all needs met at conclusion of session. PLAN  Based on pt's current level of functional independence, this pt is not a candidate for continued skilled PT services. Please re-consult if pt experiences functional decline. Thank you.     Time in: 0720  Time out: 2026 Richi Parsons, PT, DPT  [id number]

## 2019-05-20 LAB
AMPHETAMINE SCREEN, URINE: NOT DETECTED
ANION GAP SERPL CALCULATED.3IONS-SCNC: 12 MMOL/L (ref 7–16)
BARBITURATE SCREEN URINE: NOT DETECTED
BENZODIAZEPINE SCREEN, URINE: NOT DETECTED
BILIRUBIN URINE: NEGATIVE
BLOOD, URINE: NEGATIVE
BUN BLDV-MCNC: 8 MG/DL (ref 6–20)
CALCIUM SERPL-MCNC: 9.5 MG/DL (ref 8.6–10.2)
CANNABINOID SCREEN URINE: NOT DETECTED
CHLORIDE BLD-SCNC: 104 MMOL/L (ref 98–107)
CLARITY: CLEAR
CO2: 28 MMOL/L (ref 22–29)
COCAINE METABOLITE SCREEN URINE: POSITIVE
COLOR: YELLOW
CREAT SERPL-MCNC: 0.7 MG/DL (ref 0.5–1)
GFR AFRICAN AMERICAN: >60
GFR NON-AFRICAN AMERICAN: >60 ML/MIN/1.73
GLUCOSE BLD-MCNC: 100 MG/DL (ref 74–99)
GLUCOSE URINE: NEGATIVE MG/DL
HCT VFR BLD CALC: 44.3 % (ref 34–48)
HEMOGLOBIN: 14.6 G/DL (ref 11.5–15.5)
KETONES, URINE: NEGATIVE MG/DL
LEUKOCYTE ESTERASE, URINE: NEGATIVE
MAGNESIUM: 2.3 MG/DL (ref 1.6–2.6)
MCH RBC QN AUTO: 30.8 PG (ref 26–35)
MCHC RBC AUTO-ENTMCNC: 33 % (ref 32–34.5)
MCV RBC AUTO: 93.5 FL (ref 80–99.9)
METHADONE SCREEN, URINE: NOT DETECTED
NITRITE, URINE: NEGATIVE
OPIATE SCREEN URINE: NOT DETECTED
PDW BLD-RTO: 13.3 FL (ref 11.5–15)
PH UA: 8.5 (ref 5–9)
PHENCYCLIDINE SCREEN URINE: NOT DETECTED
PHOSPHORUS: 3.9 MG/DL (ref 2.5–4.5)
PLATELET # BLD: 377 E9/L (ref 130–450)
PMV BLD AUTO: 9.8 FL (ref 7–12)
POTASSIUM SERPL-SCNC: 3.8 MMOL/L (ref 3.5–5)
PROPOXYPHENE SCREEN: NOT DETECTED
PROTEIN UA: NEGATIVE MG/DL
RBC # BLD: 4.74 E12/L (ref 3.5–5.5)
SODIUM BLD-SCNC: 144 MMOL/L (ref 132–146)
SPECIFIC GRAVITY UA: 1.01 (ref 1–1.03)
UROBILINOGEN, URINE: 0.2 E.U./DL
WBC # BLD: 7.4 E9/L (ref 4.5–11.5)

## 2019-05-20 PROCEDURE — 80048 BASIC METABOLIC PNL TOTAL CA: CPT

## 2019-05-20 PROCEDURE — G0480 DRUG TEST DEF 1-7 CLASSES: HCPCS

## 2019-05-20 PROCEDURE — 6370000000 HC RX 637 (ALT 250 FOR IP): Performed by: INTERNAL MEDICINE

## 2019-05-20 PROCEDURE — 81003 URINALYSIS AUTO W/O SCOPE: CPT

## 2019-05-20 PROCEDURE — 80307 DRUG TEST PRSMV CHEM ANLYZR: CPT

## 2019-05-20 PROCEDURE — 83735 ASSAY OF MAGNESIUM: CPT

## 2019-05-20 PROCEDURE — 36415 COLL VENOUS BLD VENIPUNCTURE: CPT

## 2019-05-20 PROCEDURE — 85027 COMPLETE CBC AUTOMATED: CPT

## 2019-05-20 PROCEDURE — 84100 ASSAY OF PHOSPHORUS: CPT

## 2019-05-20 PROCEDURE — 2060000000 HC ICU INTERMEDIATE R&B

## 2019-05-20 RX ADMIN — HYDROXYZINE PAMOATE 25 MG: 25 CAPSULE ORAL at 17:36

## 2019-05-20 RX ADMIN — HYDROXYZINE PAMOATE 25 MG: 25 CAPSULE ORAL at 13:36

## 2019-05-20 RX ADMIN — HYDROXYZINE PAMOATE 25 MG: 25 CAPSULE ORAL at 22:15

## 2019-05-20 ASSESSMENT — PAIN SCALES - GENERAL
PAINLEVEL_OUTOF10: 0
PAINLEVEL_OUTOF10: 0

## 2019-05-20 NOTE — PROGRESS NOTES
Hospitalist Progress Note      Synopsis: Patient admitted on 5/18/2019 . Pt with a hx of asthma and thyroid disease who initially presented to [facility] ED for AMS following suspected overdose. Pt UDS showed cocaine and CT head showed cerebellar bleed. Pt became agitated and given benadryl and ativan and then transferred to our ED. Pt was sleepy but does awake to sternal rub. Unclear if patient took additional mediation with cocaine. Pt admitted to NSICU for further observation. Mentation improved and pt was transferred out of ICU to [facility]. Pt with hx of depression and prior SI, took valium to help her sleep. Psychiatry consulted and recommended inpatient transfer to psych. Pt currently medically stable. Awaiting psych bed     Subjective    Clinically improving. Feeling better. Stable overnight. No other overnight issues reported. Remembers taking valium to help her sleep. Does not remember going to gas station with her sister. Does not remember coming to hospital. Reports hx of suicidal ideations in the past and that her mood has worsened lately. No CP, SOB, palpitations, blurred vision, HA, lightheadedness, LOC or focal neurological deficits    Exam:  BP (!) 111/41   Pulse 83   Temp 98.2 °F (36.8 °C) (Oral)   Resp 18   Ht 5' 6\" (1.676 m)   Wt 116 lb 3.2 oz (52.7 kg)   SpO2 98%   BMI 18.76 kg/m²   General appearance: No apparent distress, appears stated age and cooperative. HEENT: Pupils equal, round, and reactive to light. Conjunctivae/corneas clear. Neck: Supple. No jugular venous distention. Trachea midline. Respiratory:  Normal respiratory effort. Clear to auscultation, bilaterally without Rales/Wheezes/Rhonchi. Cardiovascular: Regular rate and rhythm with normal S1/S2 without murmurs, rubs or gallops. Abdomen: Soft, non-tender, non-distended with normal bowel sounds. Musculoskeletal: No clubbing, cyanosis or edema bilaterally. Brisk capillary refill.  2+ lower extremity pulses (dorsalis pedis). Skin:  No rashes    Neurologic: awake, alert and following commands     Medications:  Reviewed    Infusion Medications   Scheduled Medications    sodium chloride flush  10 mL Intravenous 2 times per day     PRN Meds: hydrALAZINE, labetalol, hydrOXYzine, sodium chloride flush, ondansetron, magnesium hydroxide, albuterol    I/O    Intake/Output Summary (Last 24 hours) at 5/20/2019 1556  Last data filed at 5/20/2019 0346  Gross per 24 hour   Intake 120 ml   Output --   Net 120 ml       Labs:   Recent Labs     05/19/19  0611 05/20/19 0457   WBC 7.0 7.4   HGB 13.1 14.6   HCT 40.0 44.3    377       Recent Labs     05/19/19  0611 05/20/19 0457    144   K 3.8 3.8    104   CO2 24 28   BUN 9 8   CREATININE 0.7 0.7   CALCIUM 9.3 9.5   PHOS 3.4 3.9       No results for input(s): PROT, ALB, ALKPHOS, ALT, AST, BILITOT, AMYLASE, LIPASE in the last 72 hours. No results for input(s): INR in the last 72 hours. Recent Labs     05/18/19  1005 05/19/19  0611   CKTOTAL 129 83       Chronic labs:  Lab Results   Component Value Date    TSH 2.440 06/26/2017       Radiology:  Imaging studies reviewed today. ASSESSMENT:  Acute toxic encephalopathy  Cerebellar hemorrhage  Cocaine use  Valium overdose? - unable to determine if intentional  Depression w hx of prior suicidal ideation      PLAN:  Regular diet  Psychiatry consult  Neuro checks   NSG consulted - no intervention indicated     DVT Prophylaxis: SCD  Diet: DIET GENERAL;  Code Status: Full Code     PT/OT Eval Status: na     Dispo - stable for transfer to inpatient psych     +++++++++++++++++++++++++++++++++++++++++++++++++  Benita Rodriguez MD   [facility].  +++++++++++++++++++++++++++++++++++++++++++++++++  NOTE: This report was transcribed using voice recognition software.  Every effort was made to ensure accuracy; however, inadvertent computerized transcription errors may be present.

## 2019-05-20 NOTE — PROGRESS NOTES
Skin:  No rashes    Neurologic: awake, alert and following commands     Medications:  Reviewed    Infusion Medications   Scheduled Medications    sodium chloride flush  10 mL Intravenous 2 times per day     PRN Meds: hydrALAZINE, labetalol, hydrOXYzine, sodium chloride flush, ondansetron, magnesium hydroxide, albuterol    I/O    Intake/Output Summary (Last 24 hours) at 5/20/2019 1553  Last data filed at 5/20/2019 0346  Gross per 24 hour   Intake 120 ml   Output --   Net 120 ml       Labs:   Recent Labs     05/19/19  0611 05/20/19 0457   WBC 7.0 7.4   HGB 13.1 14.6   HCT 40.0 44.3    377       Recent Labs     05/19/19  0611 05/20/19 0457    144   K 3.8 3.8    104   CO2 24 28   BUN 9 8   CREATININE 0.7 0.7   CALCIUM 9.3 9.5   PHOS 3.4 3.9       No results for input(s): PROT, ALB, ALKPHOS, ALT, AST, BILITOT, AMYLASE, LIPASE in the last 72 hours. No results for input(s): INR in the last 72 hours. Recent Labs     05/18/19  1005 05/19/19  0611   CKTOTAL 129 83       Chronic labs:  Lab Results   Component Value Date    TSH 2.440 06/26/2017       Radiology:  Imaging studies reviewed today. ASSESSMENT:  Acute toxic encephalopathy  Cerebellar hemorrhage  Cocaine use  Depression w hx of prior suicidal ideation      PLAN:  Regular diet  Psychiatry consult  Neuro checks   NSG consulted - no intervention indicated     DVT Prophylaxis: SCD  Diet: DIET GENERAL;  Code Status: Full Code     PT/OT Eval Status: na     Dispo - stable for transfer to inpatient psych     +++++++++++++++++++++++++++++++++++++++++++++++++  Benita Jeffers MD   [facility].  +++++++++++++++++++++++++++++++++++++++++++++++++  NOTE: This report was transcribed using voice recognition software.  Every effort was made to ensure accuracy; however, inadvertent computerized transcription errors may be present.

## 2019-05-20 NOTE — ED NOTES
[facility] CALLED REQUESTING A UA - CALL TRANSFERRED TO THE FLOOR      KOFFI Braden  05/20/19 5051

## 2019-05-21 VITALS
TEMPERATURE: 98.2 F | WEIGHT: 116 LBS | SYSTOLIC BLOOD PRESSURE: 118 MMHG | RESPIRATION RATE: 18 BRPM | HEIGHT: 66 IN | OXYGEN SATURATION: 98 % | HEART RATE: 82 BPM | DIASTOLIC BLOOD PRESSURE: 62 MMHG | BODY MASS INDEX: 18.64 KG/M2

## 2019-05-21 NOTE — PROGRESS NOTES
Adult Psychiatric social worker faxed over pink slip and transport paperwork, waiting for [address] line to call this unit.

## 2019-05-21 NOTE — PROGRESS NOTES
Transport company on unit to  patient. Physician placed discharge order. Heart monitor removed, IV removed.

## 2019-05-21 NOTE — ED NOTES
Halle Tucker from [address] [place] [address] called to inform that Pt has been accepted at [facility]. Pink slip needs faxed to [phone]. Once Pink slip is received [facility] will give accepting information. Pink slip has been faxed.

## 2019-05-21 NOTE — PROGRESS NOTES
Patient aware of bed wait at facility. Patient withdrawn at times. Able to follow commands. Denies suicidal ideation. Will continue to monitor.

## 2019-05-21 NOTE — ED NOTES
Spoke with [facility] regarding transport. Greer informed they were waiting to hear form [facility] that they received pink slip.     Barrett Terrell will call [facility] to confirm and then will work on transport

## 2019-05-21 NOTE — PROGRESS NOTES
Patient to be discharged to [facility] [place], Room 110 Bed B, under care of Dr. Shirly Canavan. This nurse spoke with Hillary from [facility]. She is currently working on papers for transfer to facility. Also stated she will tube papers and pink slip to this unit to have filled out for transport. [facility] to call unit. Charge nurse aware.

## 2019-05-21 NOTE — ED NOTES
SW placed phone call to [employer] [phone] and spoke with Rep. Donita Barrientos. Notified collateral assessment completed.      MARIELENA Kirby, Dee Austen  05/20/19 2869

## 2019-05-21 NOTE — ED NOTES
Received phone call from [facility] requesting a combined assessment of patient psychiatric presentation - [facility] is reviewing.      MARIELENA Kirby, [facility]  05/20/19 0375

## 2019-05-22 ENCOUNTER — TELEPHONE (OUTPATIENT)
Dept: FAMILY MEDICINE CLINIC | Age: 39
End: 2019-05-22

## 2019-05-24 LAB — COCAINE, CONFIRM, URINE: >1000 NG/ML

## 2019-05-25 LAB — COCAINE, CONFIRM, URINE: 419 NG/ML

## 2019-06-01 NOTE — DISCHARGE SUMMARY
normal bowel sounds. Musculoskeletal: No clubbing, cyanosis or edema bilaterally. Full range of motion without deformity. Skin: Skin color, texture, turgor normal.  No rashes or lesions. Neurologic:  Neurovascularly intact without any focal sensory/motor deficits. Cranial nerves: II-XII intact, grossly non-focal.  Psychiatric: Alert and oriented, thought content appropriate, normal insight      Consults:     IP CONSULT TO DIETITIAN  IP CONSULT TO NEUROSURGERY  IP CONSULT TO PSYCHIATRY  IP CONSULT TO NEUROSURGERY    Significant Diagnostic Studies:   none    Disposition:  Inpatient psychiatry      Discharge Instructions/Follow-up:  Keep scheduled follow up appointments. Take medications as prescribed     Code Status:  Prior     Activity: activity as tolerated    Diet: regular diet    Labs: For convenience and continuity at follow-up the following most recent labs are provided:      CBC:    Lab Results   Component Value Date    WBC 7.4 05/20/2019    HGB 14.6 05/20/2019    HCT 44.3 05/20/2019     05/20/2019       Renal:    Lab Results   Component Value Date     05/20/2019    K 3.8 05/20/2019     05/20/2019    CO2 28 05/20/2019    BUN 8 05/20/2019    CREATININE 0.7 05/20/2019    CALCIUM 9.5 05/20/2019    PHOS 3.9 05/20/2019       Discharge Medications:     Discharge Medication List as of 5/21/2019  4:30 AM           Details   albuterol (PROVENTIL) (2.5 MG/3ML) 0.083% nebulizer solution Take 3 mLs by nebulization every 6 hours as needed for Wheezing, Disp-25 each, R-0      Respiratory Therapy Supplies (NEBULIZER/TUBING/MOUTHPIECE) KIT DAILY PRN Starting 11/20/2015, Until Discontinued, Disp-1 kit, R-0, Print      amphetamine-dextroamphetamine (ADDERALL XR) 10 MG XR capsule Take 20 mg by mouth 2 times daily. Zolpidem Tartrate (AMBIEN PO) Take  by mouth. alprazolam (XANAX) 0.25 MG tablet Take 1 mg by mouth 3 times daily as needed.        BuPROPion HCl (WELLBUTRIN SR PO) Take 200 mg by mouth 2 times daily. Time Spent on discharge is more than 45 minutes in the examination, evaluation, counseling and review of medications and discharge plan.       Signed:    Clemente Art MD   6/1/2019

## 2020-02-27 ENCOUNTER — APPOINTMENT (OUTPATIENT)
Dept: GENERAL RADIOLOGY | Age: 40
DRG: 812 | End: 2020-02-27
Payer: COMMERCIAL

## 2020-02-27 ENCOUNTER — APPOINTMENT (OUTPATIENT)
Dept: CT IMAGING | Age: 40
DRG: 812 | End: 2020-02-27
Payer: COMMERCIAL

## 2020-02-27 ENCOUNTER — HOSPITAL ENCOUNTER (INPATIENT)
Age: 40
LOS: 1 days | Discharge: HOME | DRG: 812 | End: 2020-02-29
Attending: EMERGENCY MEDICINE | Admitting: FAMILY MEDICINE
Payer: COMMERCIAL

## 2020-02-27 LAB
ACETAMINOPHEN LEVEL: <5 MCG/ML (ref 10–30)
ALBUMIN SERPL-MCNC: 4.5 G/DL (ref 3.5–5.2)
ALP BLD-CCNC: 122 U/L (ref 35–104)
ALT SERPL-CCNC: 10 U/L (ref 0–32)
AMPHETAMINE SCREEN, URINE: NOT DETECTED
ANION GAP SERPL CALCULATED.3IONS-SCNC: 20 MMOL/L (ref 7–16)
AST SERPL-CCNC: 20 U/L (ref 0–31)
B.E.: -11.2 MMOL/L (ref -3–3)
BACTERIA: ABNORMAL /HPF
BARBITURATE SCREEN URINE: NOT DETECTED
BENZODIAZEPINE SCREEN, URINE: NOT DETECTED
BETA-HYDROXYBUTYRATE: 0.1 MMOL/L (ref 0.02–0.27)
BILIRUB SERPL-MCNC: <0.2 MG/DL (ref 0–1.2)
BILIRUBIN URINE: NEGATIVE
BLOOD, URINE: ABNORMAL
BUN BLDV-MCNC: 9 MG/DL (ref 6–20)
CALCIUM SERPL-MCNC: 8.7 MG/DL (ref 8.6–10.2)
CANNABINOID SCREEN URINE: NOT DETECTED
CHLORIDE BLD-SCNC: 99 MMOL/L (ref 98–107)
CLARITY: CLEAR
CO2: 19 MMOL/L (ref 22–29)
COCAINE METABOLITE SCREEN URINE: POSITIVE
COHB: 5.2 % (ref 0–1.5)
COLOR: YELLOW
CREAT SERPL-MCNC: 1.1 MG/DL (ref 0.5–1)
CRITICAL: ABNORMAL
DATE ANALYZED: ABNORMAL
DATE OF COLLECTION: ABNORMAL
ETHANOL: <10 MG/DL (ref 0–0.08)
FENTANYL SCREEN, URINE: NOT DETECTED
GFR AFRICAN AMERICAN: >60
GFR NON-AFRICAN AMERICAN: 55 ML/MIN/1.73
GLUCOSE BLD-MCNC: 357 MG/DL (ref 74–99)
GLUCOSE URINE: 500 MG/DL
HCG, URINE, POC: NEGATIVE
HCO3: 17 MMOL/L (ref 22–26)
HCT VFR BLD CALC: 43.2 % (ref 34–48)
HEMOGLOBIN: 13.6 G/DL (ref 11.5–15.5)
HHB: 1.2 % (ref 0–5)
KETONES, URINE: NEGATIVE MG/DL
LAB: ABNORMAL
LACTIC ACID: 4 MMOL/L (ref 0.5–2.2)
LEUKOCYTE ESTERASE, URINE: NEGATIVE
Lab: ABNORMAL
Lab: ABNORMAL
Lab: NORMAL
MCH RBC QN AUTO: 30.9 PG (ref 26–35)
MCHC RBC AUTO-ENTMCNC: 31.5 % (ref 32–34.5)
MCV RBC AUTO: 98.2 FL (ref 80–99.9)
METHADONE SCREEN, URINE: NOT DETECTED
METHB: 0.4 % (ref 0–1.5)
MODE: ABNORMAL
NEGATIVE QC PASS/FAIL: NORMAL
NITRITE, URINE: NEGATIVE
O2 CONTENT: 18.2 ML/DL
O2 SATURATION: 98.7 % (ref 92–98.5)
O2HB: 93.2 % (ref 94–97)
OPERATOR ID: 2577
OPIATE SCREEN URINE: NOT DETECTED
OXYCODONE URINE: NOT DETECTED
PATIENT TEMP: 37 C
PCO2: 46.5 MMHG (ref 35–45)
PDW BLD-RTO: 13.1 FL (ref 11.5–15)
PH BLOOD GAS: 7.18 (ref 7.35–7.45)
PH UA: 7 (ref 5–9)
PHENCYCLIDINE SCREEN URINE: NOT DETECTED
PLATELET # BLD: 430 E9/L (ref 130–450)
PMV BLD AUTO: 9.7 FL (ref 7–12)
PO2: 274.6 MMHG (ref 75–100)
POSITIVE QC PASS/FAIL: NORMAL
POTASSIUM SERPL-SCNC: 3.2 MMOL/L (ref 3.5–5)
PROTEIN UA: 30 MG/DL
RBC # BLD: 4.4 E12/L (ref 3.5–5.5)
RBC UA: ABNORMAL /HPF (ref 0–2)
SALICYLATE, SERUM: <0.3 MG/DL (ref 0–30)
SODIUM BLD-SCNC: 138 MMOL/L (ref 132–146)
SOURCE, BLOOD GAS: ABNORMAL
SPECIFIC GRAVITY UA: 1.02 (ref 1–1.03)
THB: 13.4 G/DL (ref 11.5–16.5)
TIME ANALYZED: 2124
TOTAL PROTEIN: 7.2 G/DL (ref 6.4–8.3)
TRICYCLIC ANTIDEPRESSANTS SCREEN SERUM: NEGATIVE NG/ML
TROPONIN: <0.01 NG/ML (ref 0–0.03)
UROBILINOGEN, URINE: 0.2 E.U./DL
WBC # BLD: 18.2 E9/L (ref 4.5–11.5)
WBC UA: ABNORMAL /HPF (ref 0–5)

## 2020-02-27 PROCEDURE — 82805 BLOOD GASES W/O2 SATURATION: CPT

## 2020-02-27 PROCEDURE — 84484 ASSAY OF TROPONIN QUANT: CPT

## 2020-02-27 PROCEDURE — 6360000002 HC RX W HCPCS: Performed by: EMERGENCY MEDICINE

## 2020-02-27 PROCEDURE — 81001 URINALYSIS AUTO W/SCOPE: CPT

## 2020-02-27 PROCEDURE — 70450 CT HEAD/BRAIN W/O DYE: CPT

## 2020-02-27 PROCEDURE — 83605 ASSAY OF LACTIC ACID: CPT

## 2020-02-27 PROCEDURE — 6360000002 HC RX W HCPCS

## 2020-02-27 PROCEDURE — 96365 THER/PROPH/DIAG IV INF INIT: CPT

## 2020-02-27 PROCEDURE — 82010 KETONE BODYS QUAN: CPT

## 2020-02-27 PROCEDURE — 93005 ELECTROCARDIOGRAM TRACING: CPT | Performed by: EMERGENCY MEDICINE

## 2020-02-27 PROCEDURE — 99285 EMERGENCY DEPT VISIT HI MDM: CPT

## 2020-02-27 PROCEDURE — 96375 TX/PRO/DX INJ NEW DRUG ADDON: CPT

## 2020-02-27 PROCEDURE — 85027 COMPLETE CBC AUTOMATED: CPT

## 2020-02-27 PROCEDURE — 2580000003 HC RX 258: Performed by: EMERGENCY MEDICINE

## 2020-02-27 PROCEDURE — G0480 DRUG TEST DEF 1-7 CLASSES: HCPCS

## 2020-02-27 PROCEDURE — 87040 BLOOD CULTURE FOR BACTERIA: CPT

## 2020-02-27 PROCEDURE — 80307 DRUG TEST PRSMV CHEM ANLYZR: CPT

## 2020-02-27 PROCEDURE — 36415 COLL VENOUS BLD VENIPUNCTURE: CPT

## 2020-02-27 PROCEDURE — 80053 COMPREHEN METABOLIC PANEL: CPT

## 2020-02-27 PROCEDURE — 71045 X-RAY EXAM CHEST 1 VIEW: CPT

## 2020-02-27 RX ORDER — MAGNESIUM SULFATE IN WATER 40 MG/ML
2 INJECTION, SOLUTION INTRAVENOUS ONCE
Status: COMPLETED | OUTPATIENT
Start: 2020-02-28 | End: 2020-02-28

## 2020-02-27 RX ORDER — NALOXONE HYDROCHLORIDE 1 MG/ML
INJECTION INTRAMUSCULAR; INTRAVENOUS; SUBCUTANEOUS
Status: COMPLETED
Start: 2020-02-27 | End: 2020-02-27

## 2020-02-27 RX ORDER — POTASSIUM CHLORIDE 7.45 MG/ML
10 INJECTION INTRAVENOUS ONCE
Status: COMPLETED | OUTPATIENT
Start: 2020-02-27 | End: 2020-02-28

## 2020-02-27 RX ORDER — 0.9 % SODIUM CHLORIDE 0.9 %
1000 INTRAVENOUS SOLUTION INTRAVENOUS ONCE
Status: COMPLETED | OUTPATIENT
Start: 2020-02-27 | End: 2020-02-27

## 2020-02-27 RX ORDER — NALOXONE HYDROCHLORIDE 1 MG/ML
1 INJECTION INTRAMUSCULAR; INTRAVENOUS; SUBCUTANEOUS ONCE
Status: COMPLETED | OUTPATIENT
Start: 2020-02-27 | End: 2020-02-27

## 2020-02-27 RX ADMIN — POTASSIUM CHLORIDE 10 MEQ: 7.46 INJECTION, SOLUTION INTRAVENOUS at 23:43

## 2020-02-27 RX ADMIN — SODIUM CHLORIDE 1000 ML: 9 INJECTION, SOLUTION INTRAVENOUS at 22:09

## 2020-02-27 RX ADMIN — NALOXONE HYDROCHLORIDE 1 MG: 1 INJECTION PARENTERAL at 21:19

## 2020-02-27 RX ADMIN — NALOXONE HYDROCHLORIDE 1 MG: 1 INJECTION INTRAMUSCULAR; INTRAVENOUS; SUBCUTANEOUS at 21:19

## 2020-02-27 RX ADMIN — SODIUM CHLORIDE 1000 ML: 9 INJECTION, SOLUTION INTRAVENOUS at 21:19

## 2020-02-27 ASSESSMENT — ENCOUNTER SYMPTOMS: SHORTNESS OF BREATH: 1

## 2020-02-28 PROBLEM — Z86.73 HISTORY OF CVA (CEREBROVASCULAR ACCIDENT): Chronic | Status: ACTIVE | Noted: 2020-02-28

## 2020-02-28 PROBLEM — E23.0 HYPOGONADOTROPIC HYPOGONADISM (HCC): Status: RESOLVED | Noted: 2017-07-18 | Resolved: 2020-02-28

## 2020-02-28 PROBLEM — I61.4 CEREBELLAR HEMORRHAGE, ACUTE (HCC): Status: RESOLVED | Noted: 2019-05-18 | Resolved: 2020-02-28

## 2020-02-28 PROBLEM — E87.20 METABOLIC ACIDOSIS: Status: ACTIVE | Noted: 2020-02-28

## 2020-02-28 PROBLEM — N91.2 AMENORRHEA: Status: RESOLVED | Noted: 2017-06-13 | Resolved: 2020-02-28

## 2020-02-28 PROBLEM — T50.904A DRUG OVERDOSES, UNDETERMINED INTENT, INITIAL ENCOUNTER: Status: ACTIVE | Noted: 2020-02-28

## 2020-02-28 LAB
ANION GAP SERPL CALCULATED.3IONS-SCNC: 11 MMOL/L (ref 7–16)
ANION GAP SERPL CALCULATED.3IONS-SCNC: 11 MMOL/L (ref 7–16)
B.E.: -6.3 MMOL/L (ref -3–3)
BUN BLDV-MCNC: 5 MG/DL (ref 6–20)
BUN BLDV-MCNC: 6 MG/DL (ref 6–20)
CALCIUM IONIZED: 1.14 MMOL/L (ref 1.15–1.33)
CALCIUM SERPL-MCNC: 7.3 MG/DL (ref 8.6–10.2)
CALCIUM SERPL-MCNC: 7.5 MG/DL (ref 8.6–10.2)
CHLORIDE BLD-SCNC: 107 MMOL/L (ref 98–107)
CHLORIDE BLD-SCNC: 107 MMOL/L (ref 98–107)
CO2: 21 MMOL/L (ref 22–29)
CO2: 25 MMOL/L (ref 22–29)
COHB: 1.8 % (ref 0–1.5)
CREAT SERPL-MCNC: 0.7 MG/DL (ref 0.5–1)
CREAT SERPL-MCNC: 0.8 MG/DL (ref 0.5–1)
CRITICAL: ABNORMAL
DATE ANALYZED: ABNORMAL
DATE OF COLLECTION: ABNORMAL
EKG ATRIAL RATE: 137 BPM
EKG P AXIS: 65 DEGREES
EKG P-R INTERVAL: 122 MS
EKG Q-T INTERVAL: 286 MS
EKG QRS DURATION: 76 MS
EKG QTC CALCULATION (BAZETT): 431 MS
EKG R AXIS: 70 DEGREES
EKG T AXIS: -123 DEGREES
EKG VENTRICULAR RATE: 137 BPM
GFR AFRICAN AMERICAN: >60
GFR AFRICAN AMERICAN: >60
GFR NON-AFRICAN AMERICAN: >60 ML/MIN/1.73
GFR NON-AFRICAN AMERICAN: >60 ML/MIN/1.73
GLUCOSE BLD-MCNC: 101 MG/DL (ref 74–99)
GLUCOSE BLD-MCNC: 79 MG/DL (ref 74–99)
HCO3: 18.9 MMOL/L (ref 22–26)
HHB: 2.1 % (ref 0–5)
LAB: ABNORMAL
LACTIC ACID, SEPSIS: 1.6 MMOL/L (ref 0.5–1.9)
LACTIC ACID, SEPSIS: 2.6 MMOL/L (ref 0.5–1.9)
Lab: ABNORMAL
MAGNESIUM: 2.6 MG/DL (ref 1.6–2.6)
METHB: 0.1 % (ref 0–1.5)
MODE: ABNORMAL
O2 CONTENT: 15.4 ML/DL
O2 SATURATION: 97.9 % (ref 92–98.5)
O2HB: 96 % (ref 94–97)
OPERATOR ID: 2464
PATIENT TEMP: 37 C
PCO2: 36.5 MMHG (ref 35–45)
PH BLOOD GAS: 7.33 (ref 7.35–7.45)
PHOSPHORUS: 2.5 MG/DL (ref 2.5–4.5)
PO2: 129.2 MMHG (ref 75–100)
POTASSIUM SERPL-SCNC: 3.2 MMOL/L (ref 3.5–5)
POTASSIUM SERPL-SCNC: 4.2 MMOL/L (ref 3.5–5)
SODIUM BLD-SCNC: 139 MMOL/L (ref 132–146)
SODIUM BLD-SCNC: 143 MMOL/L (ref 132–146)
SOURCE, BLOOD GAS: ABNORMAL
THB: 11.2 G/DL (ref 11.5–16.5)
TIME ANALYZED: 156

## 2020-02-28 PROCEDURE — 83605 ASSAY OF LACTIC ACID: CPT

## 2020-02-28 PROCEDURE — 2580000003 HC RX 258: Performed by: FAMILY MEDICINE

## 2020-02-28 PROCEDURE — 96368 THER/DIAG CONCURRENT INF: CPT

## 2020-02-28 PROCEDURE — 2060000000 HC ICU INTERMEDIATE R&B

## 2020-02-28 PROCEDURE — 93010 ELECTROCARDIOGRAM REPORT: CPT | Performed by: INTERNAL MEDICINE

## 2020-02-28 PROCEDURE — 6370000000 HC RX 637 (ALT 250 FOR IP): Performed by: FAMILY MEDICINE

## 2020-02-28 PROCEDURE — 6360000002 HC RX W HCPCS: Performed by: EMERGENCY MEDICINE

## 2020-02-28 PROCEDURE — 83735 ASSAY OF MAGNESIUM: CPT

## 2020-02-28 PROCEDURE — 82330 ASSAY OF CALCIUM: CPT

## 2020-02-28 PROCEDURE — 96375 TX/PRO/DX INJ NEW DRUG ADDON: CPT

## 2020-02-28 PROCEDURE — 96366 THER/PROPH/DIAG IV INF ADDON: CPT

## 2020-02-28 PROCEDURE — 80048 BASIC METABOLIC PNL TOTAL CA: CPT

## 2020-02-28 PROCEDURE — G0378 HOSPITAL OBSERVATION PER HR: HCPCS

## 2020-02-28 PROCEDURE — 36415 COLL VENOUS BLD VENIPUNCTURE: CPT

## 2020-02-28 PROCEDURE — 82805 BLOOD GASES W/O2 SATURATION: CPT

## 2020-02-28 PROCEDURE — 84100 ASSAY OF PHOSPHORUS: CPT

## 2020-02-28 RX ORDER — IBUPROFEN 200 MG
400 TABLET ORAL EVERY 6 HOURS PRN
COMMUNITY
End: 2020-06-19 | Stop reason: ALTCHOICE

## 2020-02-28 RX ORDER — NICOTINE 21 MG/24HR
1 PATCH, TRANSDERMAL 24 HOURS TRANSDERMAL DAILY
Status: DISCONTINUED | OUTPATIENT
Start: 2020-02-28 | End: 2020-02-29 | Stop reason: HOSPADM

## 2020-02-28 RX ORDER — POTASSIUM CHLORIDE 20 MEQ/1
20 TABLET, EXTENDED RELEASE ORAL ONCE
Status: COMPLETED | OUTPATIENT
Start: 2020-02-28 | End: 2020-02-28

## 2020-02-28 RX ORDER — SODIUM CHLORIDE 9 MG/ML
INJECTION, SOLUTION INTRAVENOUS CONTINUOUS
Status: DISCONTINUED | OUTPATIENT
Start: 2020-02-28 | End: 2020-02-29 | Stop reason: HOSPADM

## 2020-02-28 RX ORDER — IBUPROFEN 400 MG/1
400 TABLET ORAL EVERY 6 HOURS PRN
Status: DISCONTINUED | OUTPATIENT
Start: 2020-02-28 | End: 2020-02-29 | Stop reason: HOSPADM

## 2020-02-28 RX ADMIN — POTASSIUM CHLORIDE 10 MEQ: 7.46 INJECTION, SOLUTION INTRAVENOUS at 00:51

## 2020-02-28 RX ADMIN — SODIUM CHLORIDE: 9 INJECTION, SOLUTION INTRAVENOUS at 01:38

## 2020-02-28 RX ADMIN — SODIUM CHLORIDE: 9 INJECTION, SOLUTION INTRAVENOUS at 13:03

## 2020-02-28 RX ADMIN — POTASSIUM CHLORIDE 20 MEQ: 1500 TABLET, EXTENDED RELEASE ORAL at 10:36

## 2020-02-28 RX ADMIN — MAGNESIUM SULFATE HEPTAHYDRATE 2 G: 40 INJECTION, SOLUTION INTRAVENOUS at 00:22

## 2020-02-28 RX ADMIN — SODIUM CHLORIDE: 9 INJECTION, SOLUTION INTRAVENOUS at 22:20

## 2020-02-28 RX ADMIN — IBUPROFEN 400 MG: 400 TABLET, FILM COATED ORAL at 19:24

## 2020-02-28 RX ADMIN — INSULIN HUMAN 5 UNITS: 100 INJECTION, SOLUTION PARENTERAL at 01:38

## 2020-02-28 ASSESSMENT — PAIN SCALES - GENERAL
PAINLEVEL_OUTOF10: 0
PAINLEVEL_OUTOF10: 0
PAINLEVEL_OUTOF10: 3
PAINLEVEL_OUTOF10: 0
PAINLEVEL_OUTOF10: 0
PAINLEVEL_OUTOF10: 3

## 2020-02-28 NOTE — ED PROVIDER NOTES
mmol/L    CO2 19 (L) 22 - 29 mmol/L    Anion Gap 20 (H) 7 - 16 mmol/L    Glucose 357 (H) 74 - 99 mg/dL    BUN 9 6 - 20 mg/dL    CREATININE 1.1 (H) 0.5 - 1.0 mg/dL    GFR Non-African American 55 >=60 mL/min/1.73    GFR African American >60     Calcium 8.7 8.6 - 10.2 mg/dL    Total Protein 7.2 6.4 - 8.3 g/dL    Alb 4.5 3.5 - 5.2 g/dL    Total Bilirubin <0.2 0.0 - 1.2 mg/dL    Alkaline Phosphatase 122 (H) 35 - 104 U/L    ALT 10 0 - 32 U/L    AST 20 0 - 31 U/L   Troponin   Result Value Ref Range    Troponin <0.01 0.00 - 0.03 ng/mL   Serum Drug Screen   Result Value Ref Range    Ethanol Lvl <10 mg/dL    Acetaminophen Level <5.0 (L) 10.0 - 13.7 mcg/mL    Salicylate, Serum <9.0 0.0 - 30.0 mg/dL    TCA Scrn NEGATIVE Cutoff:300 ng/mL   Urine Drug Screen   Result Value Ref Range    Amphetamine Screen, Urine NOT DETECTED Negative <1000 ng/mL    Barbiturate Screen, Ur NOT DETECTED Negative < 200 ng/mL    Benzodiazepine Screen, Urine NOT DETECTED Negative < 200 ng/mL    Cannabinoid Scrn, Ur NOT DETECTED Negative < 50ng/mL    Cocaine Metabolite Screen, Urine POSITIVE (A) Negative < 300 ng/mL    Opiate Scrn, Ur NOT DETECTED Negative < 300ng/mL    PCP Screen, Urine NOT DETECTED Negative < 25 ng/mL    Methadone Screen, Urine NOT DETECTED Negative <300 ng/mL    Oxycodone Urine NOT DETECTED Negative <100 ng/mL    FENTANYL SCREEN, URINE NOT DETECTED Negative <1 ng/mL    Drug Screen Comment: see below    Urinalysis   Result Value Ref Range    Color, UA Yellow Straw/Yellow    Clarity, UA Clear Clear    Glucose, Ur 500 (A) Negative mg/dL    Bilirubin Urine Negative Negative    Ketones, Urine Negative Negative mg/dL    Specific Gravity, UA 1.020 1.005 - 1.030    Blood, Urine TRACE-INTACT Negative    pH, UA 7.0 5.0 - 9.0    Protein, UA 30 (A) Negative mg/dL    Urobilinogen, Urine 0.2 <2.0 E.U./dL    Nitrite, Urine Negative Negative    Leukocyte Esterase, Urine Negative Negative   Blood Gas, Arterial   Result Value Ref Range    Date Analyzed potassium chloride 10 mEq/100 mL IVPB (Peripheral Line) (0 mEq Intravenous Stopped 2/28/20 0201)   potassium chloride 10 mEq/100 mL IVPB (Peripheral Line) (0 mEq Intravenous Stopped 2/28/20 0051)   magnesium sulfate 2 g in 50 mL IVPB premix (2 g Intravenous New Bag 2/28/20 0022)   insulin regular (HUMULIN R;NOVOLIN R) injection 5 Units (5 Units Intravenous Given 2/28/20 0138)       CONSULTATIONS:            Critical care and medicine. PROCEDURES:            none. COUNSELING:   I have spoken with the patient and discussed todays results, in addition to providing specific details for the plan of care and counseling regarding the diagnosis and prognosis.     --------------------------------------- IMPRESSION & DISPOSITION --------------------------------     IMPRESSION(s):  1. Opiate overdose, accidental or unintentional, initial encounter ([facility]    2. Nondependent cocaine abuse ([facility] [facility]    3. Diabetic ketoacidosis without coma associated with drug or chemical induced diabetes mellitus ([facility]    4. Hypokalemia        This patient's ED course included: a personal history and physicial examination, IV medications, cardiac monitoring, continuous pulse oximetry and complex medical decision making and emergency management    This patient has improved and been closely monitored during their ED course. DISPOSITION:  Disposition: Admit to CCU/ICU. Patient condition is critical.      .       Kieran Torrez DO  Resident  02/28/20 0020    ATTENDING PROVIDER ATTESTATION:     I have personally performed and/or participated in the history, exam, medical decision making, and procedures and agree with all pertinent clinical information. I have also reviewed and agree with the past medical, family and social history unless otherwise noted. I have discussed this patient in detail with the resident, and provided the instruction and education regarding sob. My findings/Plan: Patient was brought in by car.   Patient was
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
## 2020-02-29 VITALS
HEART RATE: 92 BPM | RESPIRATION RATE: 24 BRPM | DIASTOLIC BLOOD PRESSURE: 73 MMHG | TEMPERATURE: 97.8 F | HEIGHT: 65 IN | SYSTOLIC BLOOD PRESSURE: 112 MMHG | BODY MASS INDEX: 18.39 KG/M2 | WEIGHT: 110.38 LBS | OXYGEN SATURATION: 97 %

## 2020-02-29 LAB
ANION GAP SERPL CALCULATED.3IONS-SCNC: 11 MMOL/L (ref 7–16)
BUN BLDV-MCNC: 5 MG/DL (ref 6–20)
CALCIUM SERPL-MCNC: 8.5 MG/DL (ref 8.6–10.2)
CHLORIDE BLD-SCNC: 107 MMOL/L (ref 98–107)
CO2: 22 MMOL/L (ref 22–29)
CREAT SERPL-MCNC: 0.6 MG/DL (ref 0.5–1)
GFR AFRICAN AMERICAN: >60
GFR NON-AFRICAN AMERICAN: >60 ML/MIN/1.73
GLUCOSE BLD-MCNC: 88 MG/DL (ref 74–99)
POTASSIUM SERPL-SCNC: 4.2 MMOL/L (ref 3.5–5)
SODIUM BLD-SCNC: 140 MMOL/L (ref 132–146)

## 2020-02-29 PROCEDURE — 36415 COLL VENOUS BLD VENIPUNCTURE: CPT

## 2020-02-29 PROCEDURE — G0378 HOSPITAL OBSERVATION PER HR: HCPCS

## 2020-02-29 PROCEDURE — 2580000003 HC RX 258: Performed by: FAMILY MEDICINE

## 2020-02-29 PROCEDURE — 80048 BASIC METABOLIC PNL TOTAL CA: CPT

## 2020-02-29 PROCEDURE — 6370000000 HC RX 637 (ALT 250 FOR IP): Performed by: FAMILY MEDICINE

## 2020-02-29 RX ADMIN — SODIUM CHLORIDE: 9 INJECTION, SOLUTION INTRAVENOUS at 06:26

## 2020-02-29 ASSESSMENT — PAIN SCALES - GENERAL: PAINLEVEL_OUTOF10: 0

## 2020-02-29 NOTE — DISCHARGE SUMMARY
Hospital Medicine Discharge Summary    Patient: Connie Fernandez     Gender: female  : 1980   Age: 44 y.o.   MRN: [id number]    Admitting Physician: Isabella Gutierrez MD  Discharge Physician: Azeb Brown MD     Code Status: Prior full    Admit Date: 2020   Discharge Date: 2020       Disposition:  Police dept    Discharge Diagnoses: dehydration, cocaine use    Active Hospital Problems    Diagnosis Date Noted    History of CVA (cerebrovascular accident) [Z86.73] 2020    Drug overdoses, undetermined intent, initial encounter Ollie Julian     Metabolic acidosis [L90.0] 2020       Follow-up appointments:  one week w primary care doc    Outpatient to do list: none    Condition at Discharge:  [address]:   Pt came in  Dehydration and cocaine use, improved with ivf and rest stable for dc to PD    Discharge Medications:   Discharge Medication List as of 2020  8:51 AM        Discharge Medication List as of 2020  8:51 AM        Discharge Medication List as of 2020  8:51 AM      CONTINUE these medications which have NOT CHANGED    Details   ibuprofen (ADVIL;MOTRIN) 200 MG tablet Take 400 mg by mouth every 6 hours as needed for PainHistorical Med           Discharge Medication List as of 2020  8:51 AM      STOP taking these medications       albuterol (PROVENTIL) (2.5 MG/3ML) 0.083% nebulizer solution Comments:   Reason for Stopping:         Respiratory Therapy Supplies (NEBULIZER/TUBING/MOUTHPIECE) KIT Comments:   Reason for Stopping:         amphetamine-dextroamphetamine (ADDERALL XR) 10 MG XR capsule Comments:   Reason for Stopping:         Zolpidem Tartrate (AMBIEN PO) Comments:   Reason for Stopping:         alprazolam (XANAX) 0.25 MG tablet Comments:   Reason for Stopping:         BuPROPion HCl (WELLBUTRIN SR PO) Comments:   Reason for Stopping:               Discharge ROS:  A complete review of systems was asked and negative      Discharge Exam:    /73   Pulse 92   Temp 97.8 °F (36.6 °C) (Temporal)   Resp 24   Ht 5' 5\" (1.651 m)   Wt 110 lb 6 oz (50.1 kg)   LMP  (LMP Unknown)   SpO2 97%   BMI 18.37 kg/m²   General appearance:  NAD  HEENT:   Normal cephalic, atraumatic, moist mucous membranes, no oropharyngeal erythema or exudate  Neck: Supple, trachea midline, no anterior cervical or SC LAD  Heart[de-identified] Normal s1/s2, RRR, no murmurs, gallops, or rubs. no leg edema  Lungs:  no bilaterally, no wheeze, no rales or rhonchi, no use of accessory musclesNormal respiratory effort. Clear to auscultation, bilaterally without Rales/Wheezes/Rhonchi. Abdomen: Soft, non-tender, non-distended, bowel sounds present, no masses  Musculoskeletal:  No clubbing, no cyanosis, no edema  Skin: No lesion or masses  Neurologic:  Neurovascularly intact without any focal sensory/motor deficits. Cranial nerves: II-XII intact, grossly non-focal.  Psychiatric:  A & O x3  Neuro: Grossly intact, moves all four extremities     Labs:  For convenience and continuity at follow-up the following most recent labs are provided:    Lab Results   Component Value Date    WBC 18.2 2020    HGB 13.6 2020    HCT 43.2 2020    MCV 98.2 2020     2020     2020    K 4.2 2020     2020    CO2 22 2020    BUN 5 2020    CREATININE 0.6 2020    CALCIUM 8.5 2020    PHOS 2.5 2020    ALKPHOS 122 2020    ALT 10 2020    AST 20 2020    BILITOT <0.2 2020    LABALBU 4.5 2020     No results found for: INR    Radiology:  Ct Head Wo Contrast    Result Date: 2020  Patient MRN:  [id number] : 1980 Age: 44 years Gender: Female Order Date:  2020 9:15 PM EXAM: CT HEAD WO CONTRAST COMPARISON: May 18, 2019, CT from 2011, and MRI brain with contrast from 2017 INDICATION:  Evaluate intracranial abnormality Evaluate intracranial abnormality TECHNIQUE: Axial please feel free to contact me at

## 2020-02-29 NOTE — PROGRESS NOTES
Hospitalist Progress Note      PCP: Blake Cortes DO    Date of Admission: 2/27/2020    Chief Complaint: cocaine use, dehydration    Hospital Course: came in  Dehydration and cocaine use, improved with ivf and rest stable for dc to PD    Subjective: no events       Medications:  Reviewed    Infusion Medications   Scheduled Medications   PRN Meds:       Intake/Output Summary (Last 24 hours) at 2/29/2020 1242  Last data filed at 2/29/2020 0930  Gross per 24 hour   Intake 3054.27 ml   Output --   Net 3054.27 ml       Exam:    /73   Pulse 92   Temp 97.8 °F (36.6 °C) (Temporal)   Resp 24   Ht 5' 5\" (1.651 m)   Wt 110 lb 6 oz (50.1 kg)   LMP  (LMP Unknown)   SpO2 97%   BMI 18.37 kg/m²     General appearance: No apparent distress, appears stated age and cooperative. HEENT: Pupils equal, round, and reactive to light. Conjunctivae/corneas clear. Neck: Supple, with full range of motion. No jugular venous distention. Trachea midline. Respiratory:  Normal respiratory effort. Clear to auscultation, bilaterally without Rales/Wheezes/Rhonchi. Cardiovascular: Regular rate and rhythm with normal S1/S2 without murmurs, rubs or gallops. Abdomen: Soft, non-tender, non-distended with normal bowel sounds. Musculoskeletal: No clubbing, cyanosis or edema bilaterally. Full range of motion without deformity. Skin: Skin color, texture, turgor normal.  No rashes or lesions. Neurologic:  Neurovascularly intact without any focal sensory/motor deficits.  Cranial nerves: II-XII intact, grossly non-focal.  Psychiatric: Alert and oriented, thought content appropriate, normal insight  Capillary Refill: Brisk,< 3 seconds   Peripheral Pulses: +2 palpable, equal bilaterally       Labs:   Recent Labs     02/27/20  2107   WBC 18.2*   HGB 13.6   HCT 43.2        Recent Labs     02/28/20  0139 02/28/20  0916 02/29/20  0429    143 140   K 4.2 3.2* 4.2    107 107   CO2 21* 25 22   BUN 6 5* 5*   CREATININE 0.8 0.7 0.6   CALCIUM 7.3* 7.5* 8.5*   PHOS 2.5  --   --      Recent Labs     02/27/20 2107   AST 20   ALT 10   BILITOT <0.2   ALKPHOS 122*     No results for input(s): INR in the last 72 hours.   Recent Labs     02/27/20 2107   TROPONINI <0.01       Assessment/Plan:    Active Hospital Problems    Diagnosis Date Noted    History of CVA (cerebrovascular accident) [Z86.73] 02/28/2020    Drug overdoses, undetermined intent, initial encounter Lucia Slater 17/35/1535    Metabolic acidosis [F62.6] 02/28/2020   cocaine use stable  Dehydration resolved  Smoker uncontrolled      DVT Prophylaxis:    Diet: No diet orders on file  Code Status: Prior    PT/OT Eval Status: na    Dispo - PD    Taran Monae MD

## 2020-02-29 NOTE — PLAN OF CARE
Problem: Pain:  Goal: Pain level will decrease  Description  Pain level will decrease  2/28/2020 1651 by Cheryle Montes De Oca RN  Outcome: Met This Shift     Problem: Falls - Risk of:  Goal: Will remain free from falls  Description  Will remain free from falls  Outcome: Met This Shift  Goal: Absence of physical injury  Description  Absence of physical injury  Outcome: Met This Shift

## 2020-03-04 LAB
BLOOD CULTURE, ROUTINE: NORMAL
CULTURE, BLOOD 2: NORMAL

## 2020-04-15 ENCOUNTER — HOSPITAL ENCOUNTER (EMERGENCY)
Age: 40
Discharge: HOME | End: 2020-04-15
Attending: EMERGENCY MEDICINE
Payer: COMMERCIAL

## 2020-04-15 VITALS
TEMPERATURE: 98.7 F | RESPIRATION RATE: 18 BRPM | BODY MASS INDEX: 20.33 KG/M2 | SYSTOLIC BLOOD PRESSURE: 110 MMHG | HEART RATE: 70 BPM | HEIGHT: 65 IN | OXYGEN SATURATION: 98 % | WEIGHT: 122 LBS | DIASTOLIC BLOOD PRESSURE: 74 MMHG

## 2020-04-15 PROCEDURE — 99282 EMERGENCY DEPT VISIT SF MDM: CPT

## 2020-04-15 RX ORDER — CLINDAMYCIN HYDROCHLORIDE 150 MG/1
150 CAPSULE ORAL 3 TIMES DAILY
Qty: 30 CAPSULE | Refills: 0 | Status: SHIPPED | OUTPATIENT
Start: 2020-04-15 | End: 2020-04-25

## 2020-04-15 ASSESSMENT — PAIN DESCRIPTION - ONSET: ONSET: SUDDEN

## 2020-04-15 ASSESSMENT — PAIN DESCRIPTION - LOCATION: LOCATION: TEETH

## 2020-04-15 ASSESSMENT — PAIN DESCRIPTION - ORIENTATION: ORIENTATION: RIGHT;LOWER

## 2020-04-15 ASSESSMENT — PAIN SCALES - GENERAL: PAINLEVEL_OUTOF10: 7

## 2020-04-15 ASSESSMENT — PAIN DESCRIPTION - FREQUENCY: FREQUENCY: CONTINUOUS

## 2020-04-15 ASSESSMENT — PAIN DESCRIPTION - PROGRESSION: CLINICAL_PROGRESSION: GRADUALLY WORSENING

## 2020-04-15 ASSESSMENT — PAIN DESCRIPTION - PAIN TYPE: TYPE: ACUTE PAIN

## 2020-04-15 ASSESSMENT — PAIN DESCRIPTION - DESCRIPTORS: DESCRIPTORS: SHOOTING

## 2020-06-09 ENCOUNTER — HOSPITAL ENCOUNTER (EMERGENCY)
Age: 40
Discharge: HOME | End: 2020-06-09
Payer: COMMERCIAL

## 2020-06-09 ENCOUNTER — APPOINTMENT (OUTPATIENT)
Dept: GENERAL RADIOLOGY | Age: 40
End: 2020-06-09
Payer: COMMERCIAL

## 2020-06-09 VITALS
WEIGHT: 125 LBS | RESPIRATION RATE: 18 BRPM | DIASTOLIC BLOOD PRESSURE: 78 MMHG | HEART RATE: 88 BPM | BODY MASS INDEX: 20.83 KG/M2 | OXYGEN SATURATION: 99 % | TEMPERATURE: 98.2 F | HEIGHT: 65 IN | SYSTOLIC BLOOD PRESSURE: 130 MMHG

## 2020-06-09 PROCEDURE — 99283 EMERGENCY DEPT VISIT LOW MDM: CPT

## 2020-06-09 PROCEDURE — 73110 X-RAY EXAM OF WRIST: CPT

## 2020-06-09 ASSESSMENT — ENCOUNTER SYMPTOMS
SHORTNESS OF BREATH: 0
BACK PAIN: 0
CHEST TIGHTNESS: 0
COLOR CHANGE: 1
COUGH: 0

## 2020-06-09 NOTE — ED PROVIDER NOTES
[facility]        Department of Emergency Medicine   ED  Provider Note  Admit Date/RoomTime: 6/9/2020  3:20 PM  ED Room: [address] [place] [address]  HPI:  6/9/20, Time: 4:36 PM EDT      45 yo female presenting to the ED with pain and bruising to her RT wrist.  The patient states that she already has carpal tunnel syndrome but her pain became much worse today. She states she works in a [employer] and is constantly doing repetitive motions of her wrist.  She is not sure if she bumped it on something or how she injured it but she states she looked down and noticed there was a large area of bruising on her wrist and it is very painful. She has not taken anything for the pain. She denies any numbness or tingling in the fingers, redness, warmth, weakness in the hand. The history is provided by the patient. No  was used. REVIEW OF SYSTEMS:  Review of Systems   Constitutional: Negative for activity change, chills, fatigue and fever. Respiratory: Negative for cough, chest tightness and shortness of breath. Cardiovascular: Negative for chest pain, palpitations and leg swelling. Musculoskeletal: Positive for arthralgias and joint swelling. Negative for back pain, myalgias, neck pain and neck stiffness. Skin: Positive for color change. Negative for pallor and rash. Neurological: Negative for dizziness, weakness, light-headedness and headaches. Psychiatric/Behavioral: Negative for agitation, behavioral problems and confusion. Pertinent positives and negatives are stated within HPI, all other systems reviewed and are negative.      --------------------------------------------- PAST HISTORY ---------------------------------------------  Past Medical History:  has a past medical history of Asthma and Thyroid disease. Past Surgical History:  has a past surgical history that includes Dental surgery (5/2/2012); cyst removal; and Dilation and curettage of uterus.     Social History:  reports that home  Patient condition is good      Electronically signed by Altagracia Lockett PA-C   DD: 6/9/20  **This report was transcribed using voice recognition software. Every effort was made to ensure accuracy; however, inadvertent computerized transcription errors may be present.   END OF ED PROVIDER NOTE          Altagracia Lockett PA-C  06/09/20 9325

## 2020-06-19 ENCOUNTER — APPOINTMENT (OUTPATIENT)
Dept: GENERAL RADIOLOGY | Age: 40
End: 2020-06-19
Payer: COMMERCIAL

## 2020-06-19 ENCOUNTER — HOSPITAL ENCOUNTER (EMERGENCY)
Age: 40
Discharge: HOME | End: 2020-06-19
Payer: COMMERCIAL

## 2020-06-19 VITALS
SYSTOLIC BLOOD PRESSURE: 109 MMHG | WEIGHT: 122 LBS | OXYGEN SATURATION: 97 % | BODY MASS INDEX: 20.33 KG/M2 | HEART RATE: 89 BPM | RESPIRATION RATE: 16 BRPM | TEMPERATURE: 98 F | DIASTOLIC BLOOD PRESSURE: 66 MMHG | HEIGHT: 65 IN

## 2020-06-19 PROCEDURE — 99283 EMERGENCY DEPT VISIT LOW MDM: CPT

## 2020-06-19 PROCEDURE — 90715 TDAP VACCINE 7 YRS/> IM: CPT | Performed by: NURSE PRACTITIONER

## 2020-06-19 PROCEDURE — 90471 IMMUNIZATION ADMIN: CPT | Performed by: NURSE PRACTITIONER

## 2020-06-19 PROCEDURE — 6370000000 HC RX 637 (ALT 250 FOR IP): Performed by: NURSE PRACTITIONER

## 2020-06-19 PROCEDURE — 10061 I&D ABSCESS COMP/MULTIPLE: CPT

## 2020-06-19 PROCEDURE — 2500000003 HC RX 250 WO HCPCS: Performed by: NURSE PRACTITIONER

## 2020-06-19 PROCEDURE — 6360000002 HC RX W HCPCS: Performed by: NURSE PRACTITIONER

## 2020-06-19 PROCEDURE — 73140 X-RAY EXAM OF FINGER(S): CPT

## 2020-06-19 RX ORDER — IBUPROFEN 800 MG/1
800 TABLET ORAL ONCE
Status: COMPLETED | OUTPATIENT
Start: 2020-06-19 | End: 2020-06-19

## 2020-06-19 RX ORDER — SULFAMETHOXAZOLE AND TRIMETHOPRIM 800; 160 MG/1; MG/1
1 TABLET ORAL 2 TIMES DAILY
Qty: 20 TABLET | Refills: 0 | Status: SHIPPED | OUTPATIENT
Start: 2020-06-19 | End: 2020-06-29

## 2020-06-19 RX ORDER — CEPHALEXIN 500 MG/1
500 CAPSULE ORAL 4 TIMES DAILY
Qty: 40 CAPSULE | Refills: 0 | Status: SHIPPED | OUTPATIENT
Start: 2020-06-19 | End: 2020-06-29

## 2020-06-19 RX ORDER — IBUPROFEN 800 MG/1
800 TABLET ORAL EVERY 6 HOURS PRN
Qty: 20 TABLET | Refills: 0 | Status: SHIPPED | OUTPATIENT
Start: 2020-06-19 | End: 2020-06-24

## 2020-06-19 RX ORDER — LIDOCAINE HYDROCHLORIDE 10 MG/ML
20 INJECTION, SOLUTION INFILTRATION; PERINEURAL ONCE
Status: COMPLETED | OUTPATIENT
Start: 2020-06-19 | End: 2020-06-19

## 2020-06-19 RX ADMIN — IBUPROFEN 800 MG: 800 TABLET, FILM COATED ORAL at 14:17

## 2020-06-19 RX ADMIN — TETANUS TOXOID, REDUCED DIPHTHERIA TOXOID AND ACELLULAR PERTUSSIS VACCINE, ADSORBED 0.5 ML: 5; 2.5; 8; 8; 2.5 SUSPENSION INTRAMUSCULAR at 13:37

## 2020-06-19 RX ADMIN — LIDOCAINE HYDROCHLORIDE 20 ML: 10 INJECTION, SOLUTION INFILTRATION; PERINEURAL at 14:17

## 2020-06-19 ASSESSMENT — PAIN DESCRIPTION - LOCATION: LOCATION: FINGER (COMMENT WHICH ONE)

## 2020-06-19 ASSESSMENT — PAIN SCALES - GENERAL
PAINLEVEL_OUTOF10: 7
PAINLEVEL_OUTOF10: 7

## 2020-06-19 ASSESSMENT — PAIN DESCRIPTION - PAIN TYPE: TYPE: ACUTE PAIN

## 2020-06-19 NOTE — ED NOTES
Patient given discharge paperwork at this time. Patient also given scripts. Patient has no further questions at this time. Nurse provided education to patient. Patient is alert and oriented and VS are stable at this time.       Lucho Saucedo RN  06/19/20 7380

## 2020-06-19 NOTE — ED PROVIDER NOTES
[facility]         Department of Emergency Medicine   ED  Provider Note  Admit Date/RoomTime: 6/19/2020 12:53 PM  ED Room: 06/06   Chief Complaint   Nail Problem (right 5th digit -red warm and swollen)    History of Present Illness   Source of history provided by:  patient. History/Exam Limitations: none. Alta Lira is a 44 y.o. old female presenting to the emergency department by private vehicle, for pain and redness to the right fifth finger paronychia which occured approximately 2 weeks ago. Patient thinks she has a piece of plastic in the finger working at [employer] and states she quit the job couple weeks ago did not want to fill Workmen's Comp. Patient denies any fever, chills, pain with movement of the finger. She is right-hand dominant. Her tetanus is not up-to-date. Previous injury: no.  Since onset the symptoms have been moderate in degree. Her pain is aggraveated by palpation and relieved by nothing. States she did attempt to squeeze the area and had purulent drainage yesterday. ROS    Pertinent positives and negatives are stated within HPI, all other systems reviewed and are negative. Past Surgical History:   Procedure Laterality Date    CYST REMOVAL      on thyroid    DENTAL SURGERY  5/2/2012    upper teeth extraction    DILATION AND CURETTAGE OF UTERUS     Social History:  reports that she has been smoking cigarettes. She has been smoking about 1.00 pack per day. She has never used smokeless tobacco. She reports current alcohol use. She reports current drug use. Drugs: Cocaine and Marijuana. Family History: family history is not on file. Allergies: Patient has no known allergies.     Physical Exam           ED Triage Vitals   BP Temp Temp Source Pulse Resp SpO2 Height Weight   06/19/20 1257 06/19/20 1256 06/19/20 1256 06/19/20 1257 06/19/20 1256 06/19/20 1256 06/19/20 1259 06/19/20 1259   109/66 98 °F (36.7 °C) Infrared 89 16 97 % 5' 5\" (1.651 m) 122 lb Consult(s):   None    Procedure(s):     PROCEDURE  6/19/20       Time: 1418    INCISION AND DRAINAGE  Risks, benefits and alternatives (for applicable procedures below) described. Performed By: GIRMA Tolbert CNP. Indication: Paronychial abscess. Informed consent obtained: The patient was counseled regarding the procedure, it's indications, risks, potential complications and alternatives and any questions were answered. Consent was obtained. .  Prep: The skin was irrigated with sterile saline, cleansed with povidone iodine and draped in a sterile fashion. Anesthetic: The wound area was anesthetized with Lidocaine 1% without epinephrine. Incision: Soft tissue abscess of paronychia was Incised by scalpal and minimal, bloody, purulent fluid was drained. A wound culture was not obtained. The wound  was not irrigated and was not packed with iodoform gauze. The wound was then covered with a sterile dressing. Patient tolerated the procedure well. Medical Decision Making:    Patient's x-ray was negative for any acute foreign body. She does have an infected paronychia and no felon or signs of flexor tenosynovitis. She is afebrile, nontoxic in appearance, hemodynamically stable. Patient advised on signs and symptoms warranting immediate return to the ED for reevaluation such as streaking up the arm; worsening pain; fever or chills. Will discharge with antibiotics and advised on warm soaks 3-4 times a day. Advised to follow-up with PCP for evaluation next 2 to 3 days. Counseling: The emergency provider has spoken with the patient and discussed todays results, in addition to providing specific details for the plan of care and counseling regarding the diagnosis and prognosis. Questions are answered at this time and they are agreeable with the plan. Assessment      1. Paronychia of finger of right hand    2.  Need for Tdap vaccination      Plan   Discharge to home  Patient condition is

## 2023-01-16 ENCOUNTER — HOSPITAL ENCOUNTER (EMERGENCY)
Age: 43
Discharge: LEFT BEFORE DISCHARGE | End: 2023-01-17
Payer: COMMERCIAL

## 2023-01-16 PROCEDURE — 80179 DRUG ASSAY SALICYLATE: CPT

## 2023-01-16 PROCEDURE — 87636 SARSCOV2 & INF A&B AMP PRB: CPT

## 2023-01-16 PROCEDURE — 93005 ELECTROCARDIOGRAM TRACING: CPT | Performed by: PHYSICIAN ASSISTANT

## 2023-01-16 PROCEDURE — 81001 URINALYSIS AUTO W/SCOPE: CPT

## 2023-01-16 PROCEDURE — 99284 EMERGENCY DEPT VISIT MOD MDM: CPT

## 2023-01-16 PROCEDURE — 80053 COMPREHEN METABOLIC PANEL: CPT

## 2023-01-16 PROCEDURE — 80307 DRUG TEST PRSMV CHEM ANLYZR: CPT

## 2023-01-16 PROCEDURE — 82077 ASSAY SPEC XCP UR&BREATH IA: CPT

## 2023-01-16 PROCEDURE — 80143 DRUG ASSAY ACETAMINOPHEN: CPT

## 2023-01-16 PROCEDURE — 85025 COMPLETE CBC W/AUTO DIFF WBC: CPT

## 2023-01-16 ASSESSMENT — PAIN - FUNCTIONAL ASSESSMENT: PAIN_FUNCTIONAL_ASSESSMENT: NONE - DENIES PAIN

## 2023-01-16 ASSESSMENT — LIFESTYLE VARIABLES
HOW MANY STANDARD DRINKS CONTAINING ALCOHOL DO YOU HAVE ON A TYPICAL DAY: 1 OR 2
HOW OFTEN DO YOU HAVE A DRINK CONTAINING ALCOHOL: MONTHLY OR LESS

## 2023-01-17 ENCOUNTER — HOSPITAL ENCOUNTER (EMERGENCY)
Age: 43
Discharge: HOME | End: 2023-01-17
Payer: COMMERCIAL

## 2023-01-17 VITALS
HEART RATE: 95 BPM | HEIGHT: 65 IN | DIASTOLIC BLOOD PRESSURE: 68 MMHG | WEIGHT: 120 LBS | RESPIRATION RATE: 18 BRPM | BODY MASS INDEX: 19.99 KG/M2 | SYSTOLIC BLOOD PRESSURE: 106 MMHG | TEMPERATURE: 98 F | OXYGEN SATURATION: 95 %

## 2023-01-17 VITALS
BODY MASS INDEX: 19.97 KG/M2 | WEIGHT: 120 LBS | HEART RATE: 89 BPM | RESPIRATION RATE: 18 BRPM | DIASTOLIC BLOOD PRESSURE: 86 MMHG | OXYGEN SATURATION: 99 % | TEMPERATURE: 98 F | SYSTOLIC BLOOD PRESSURE: 115 MMHG

## 2023-01-17 DIAGNOSIS — L03.019 PARONYCHIA OF FINGER, UNSPECIFIED LATERALITY: Primary | ICD-10-CM

## 2023-01-17 LAB
ACETAMINOPHEN LEVEL: <5 MCG/ML (ref 10–30)
ALBUMIN SERPL-MCNC: 3.6 G/DL (ref 3.5–5.2)
ALP BLD-CCNC: 76 U/L (ref 35–104)
ALT SERPL-CCNC: 9 U/L (ref 0–32)
AMPHETAMINE SCREEN, URINE: NOT DETECTED
ANION GAP SERPL CALCULATED.3IONS-SCNC: 12 MMOL/L (ref 7–16)
AST SERPL-CCNC: 18 U/L (ref 0–31)
BACTERIA: ABNORMAL /HPF
BARBITURATE SCREEN URINE: NOT DETECTED
BASOPHILS ABSOLUTE: 0.07 E9/L (ref 0–0.2)
BASOPHILS RELATIVE PERCENT: 0.8 % (ref 0–2)
BENZODIAZEPINE SCREEN, URINE: NOT DETECTED
BILIRUB SERPL-MCNC: 0.3 MG/DL (ref 0–1.2)
BILIRUBIN URINE: NEGATIVE
BLOOD, URINE: NEGATIVE
BUN BLDV-MCNC: 12 MG/DL (ref 6–20)
CALCIUM SERPL-MCNC: 8.2 MG/DL (ref 8.6–10.2)
CANNABINOID SCREEN URINE: NOT DETECTED
CHLORIDE BLD-SCNC: 102 MMOL/L (ref 98–107)
CLARITY: ABNORMAL
CO2: 21 MMOL/L (ref 22–29)
COCAINE METABOLITE SCREEN URINE: POSITIVE
COLOR: YELLOW
CREAT SERPL-MCNC: 0.7 MG/DL (ref 0.5–1)
EKG ATRIAL RATE: 79 BPM
EKG P AXIS: 80 DEGREES
EKG P-R INTERVAL: 128 MS
EKG Q-T INTERVAL: 402 MS
EKG QRS DURATION: 86 MS
EKG QTC CALCULATION (BAZETT): 460 MS
EKG R AXIS: 83 DEGREES
EKG T AXIS: 74 DEGREES
EKG VENTRICULAR RATE: 79 BPM
EOSINOPHILS ABSOLUTE: 0.44 E9/L (ref 0.05–0.5)
EOSINOPHILS RELATIVE PERCENT: 4.9 % (ref 0–6)
EPITHELIAL CELLS, UA: ABNORMAL /HPF
ETHANOL: <10 MG/DL (ref 0–0.08)
FENTANYL SCREEN, URINE: POSITIVE
GFR SERPL CREATININE-BSD FRML MDRD: >60 ML/MIN/1.73
GLUCOSE BLD-MCNC: 126 MG/DL (ref 74–99)
GLUCOSE URINE: NEGATIVE MG/DL
HCT VFR BLD CALC: 41.2 % (ref 34–48)
HEMOGLOBIN: 14 G/DL (ref 11.5–15.5)
IMMATURE GRANULOCYTES #: 0.03 E9/L
IMMATURE GRANULOCYTES %: 0.3 % (ref 0–5)
INFLUENZA A: NOT DETECTED
INFLUENZA B: NOT DETECTED
KETONES, URINE: NEGATIVE MG/DL
LEUKOCYTE ESTERASE, URINE: ABNORMAL
LYMPHOCYTES ABSOLUTE: 2.98 E9/L (ref 1.5–4)
LYMPHOCYTES RELATIVE PERCENT: 33.4 % (ref 20–42)
Lab: ABNORMAL
MCH RBC QN AUTO: 31.3 PG (ref 26–35)
MCHC RBC AUTO-ENTMCNC: 34 % (ref 32–34.5)
MCV RBC AUTO: 92 FL (ref 80–99.9)
METHADONE SCREEN, URINE: NOT DETECTED
MONOCYTES ABSOLUTE: 0.58 E9/L (ref 0.1–0.95)
MONOCYTES RELATIVE PERCENT: 6.5 % (ref 2–12)
NEUTROPHILS ABSOLUTE: 4.81 E9/L (ref 1.8–7.3)
NEUTROPHILS RELATIVE PERCENT: 54.1 % (ref 43–80)
NITRITE, URINE: NEGATIVE
OPIATE SCREEN URINE: NOT DETECTED
OXYCODONE URINE: NOT DETECTED
PDW BLD-RTO: 12.6 FL (ref 11.5–15)
PH UA: 5.5 (ref 5–9)
PHENCYCLIDINE SCREEN URINE: NOT DETECTED
PLATELET # BLD: 299 E9/L (ref 130–450)
PMV BLD AUTO: 9.3 FL (ref 7–12)
POTASSIUM SERPL-SCNC: 3.4 MMOL/L (ref 3.5–5)
PROTEIN UA: NEGATIVE MG/DL
RBC # BLD: 4.48 E12/L (ref 3.5–5.5)
RBC UA: ABNORMAL /HPF (ref 0–2)
SALICYLATE, SERUM: <0.3 MG/DL (ref 0–30)
SARS-COV-2 RNA, RT PCR: NOT DETECTED
SODIUM BLD-SCNC: 135 MMOL/L (ref 132–146)
SPECIFIC GRAVITY UA: 1.02 (ref 1–1.03)
TOTAL PROTEIN: 5.9 G/DL (ref 6.4–8.3)
TRICYCLIC ANTIDEPRESSANTS SCREEN SERUM: NEGATIVE NG/ML
UROBILINOGEN, URINE: 0.2 E.U./DL
WBC # BLD: 8.9 E9/L (ref 4.5–11.5)
WBC UA: ABNORMAL /HPF (ref 0–5)

## 2023-01-17 PROCEDURE — 93010 ELECTROCARDIOGRAM REPORT: CPT | Performed by: INTERNAL MEDICINE

## 2023-01-17 PROCEDURE — 99283 EMERGENCY DEPT VISIT LOW MDM: CPT

## 2023-01-17 RX ORDER — CEPHALEXIN 500 MG/1
500 CAPSULE ORAL 4 TIMES DAILY
Qty: 28 CAPSULE | Refills: 0 | Status: SHIPPED | OUTPATIENT
Start: 2023-01-17 | End: 2023-01-24

## 2023-01-17 NOTE — ED PROVIDER NOTES
[facility]. Department of Emergency Medicine   ED  Provider Note  Admit Date/RoomTime: 1/17/2023 11:03 AM  ED Room: 29/29        HPI:  1/17/23,   Time: 11:38 AM ANNE Paredes is a 43 y.o. female presenting to the ED for red rash around fingernails, beginning few weeks ago. The complaint has been persistent, mild in severity, and worsened by nothing. The patient states that she thinks she may have cut her finger on a piece of glass while doing dishes. She is showing me a hangnail and reporting quite a bit of discharge or drainage. There is a couple light inflamed areas around several of her fingers. She is showing me several other scabs along the dorsal surface of her hand as well. She is states that she was seen yesterday downtown but waited for 4 hours and never got out of the emergency room. It looks like at that time she described \"parasites\" crawling all over her skin. There is a history of prior drug abuse. She states that she has no PCP. Again she does not describe the parasites for me but is more concerned about the red areas and inflamed tissue around her fingernails.         ROS:     Constitutional: Negative for fever and chills  HENT: Negative for ear pain, sore throat and sinus pressure  Eyes: Negative for pain, discharge and redness  Respiratory:  Negative for shortness of breath, cough and wheezing  Cardiovascular: Negative for CP, edema or palpitations  Gastrointestinal: Negative for nausea, vomiting, diarrhea and abdominal distention  Genitourinary: Negative for dysuria and frequency  Musculoskeletal:  See HPI  Skin: See HPI  Neurological: Negative for weakness and headaches  Hematological: Negative for adenopathy    All other systems reviewed and are negative      -------------------------------- PAST HISTORY ----------------------------------  Past Medical History:  has a past medical history of Asthma and Thyroid disease. Past Surgical History:  has a past surgical history that includes Dental surgery (5/2/2012); cyst removal; and Dilation and curettage of uterus. Social History:  reports that she has been smoking cigarettes. She has been smoking an average of 1 pack per day. She has never used smokeless tobacco. She reports current alcohol use. She reports current drug use. Drugs: Cocaine and Marijuana (Edilma Amaya). Family History: family history is not on file. The patients home medications have been reviewed. Allergies: Patient has no known allergies. --------------------------------- RESULTS ------------------------------------------  All laboratory and radiology results have been personally reviewed by myself   LABS:  No results found for this visit on 01/17/23. RADIOLOGY:  Interpreted by Radiologist.  No orders to display       ----------------- NURSING NOTES AND VITALS REVIEWED ---------------   The nursing notes within the ED encounter and vital signs as below have been reviewed. /86   Pulse 89   Temp 98 °F (36.7 °C)   Resp 18   Wt 120 lb (54.4 kg)   LMP 01/02/2023 (Approximate) Comment: Had period 2x last month  SpO2 99%   BMI 19.97 kg/m²   Oxygen Saturation Interpretation: Normal      --------------------------------PHYSICAL EXAM------------------------------------      Constitutional/General: Alert and oriented x3, well appearing, non toxic in NAD  Head: NC/AT  Eyes: PERRL, EOMI  Mouth: Oropharynx clear, handling secretions, no trismus  Neck: Supple, full ROM, no meningeal signs  Pulmonary: Lungs clear to auscultation bilaterally, no wheezes, rales, or rhonchi. Not in respiratory distress  Cardiovascular:  Regular rate and rhythm, no murmurs, gallops, or rubs. 2+ distal pulses  Abdomen: Soft, + BS. No distension. Nontender.   No palpable rigidity, rebound or guarding  Extremities: Moves all extremities x 4. See HPI  Warm and well perfused  Skin:  Pt has several hangnails with early paronychia noted, right index finger, left thumb. Concerned about a possible rash in the scalp but I do not see anything abnormal.  Neurologic: GCS 15,  Intact. No focal deficits  Psych: Normal Affect      ------------------------ ED COURSE/MEDICAL DECISION MAKING----------------------  Medications - No data to display      Medical Decision Making:    Acute paronychia. We talked about etiology and treatment. Plan discharge home with Keflex. The patient is aware and agreeable to this plan. Advised follow-up with her PCP. She was given contact information for the [facility] [place]       Counseling: The emergency provider has spoken with the patient and discussed todays results, in addition to providing specific details for the plan of care and counseling regarding the diagnosis and prognosis. Questions are answered at this time and they are agreeable with the plan.      ------------------------ IMPRESSION AND DISPOSITION -------------------------------    IMPRESSION  1.  Paronychia of finger, unspecified laterality        DISPOSITION  Disposition: Discharge to home  Patient condition is stable                   Gomez Mcnamara PA-C  01/17/23 114

## 2023-01-17 NOTE — ED NOTES
Attempted to call patient back for a bed and no response x 3 attempts in the waiting room      Leidy Funez RN  01/17/23 [id number]

## 2023-01-17 NOTE — ED NOTES
Department of Emergency Medicine  FIRST PROVIDER TRIAGE NOTE             [facility]           1/16/23  11:36 PM EST    Date of Encounter: 1/16/23   MRN: [id number]      HPI: Clive Dinh is a 43 y.o. female who presents to the ED for No chief complaint on file. Pt presenting because of \"parasites\" crawling on skin for a few weeks     ROS: Negative for cp or sob. PE: Gen Appearance/Constitutional: alert  HEENT: NC/NT. PERRLA,  Airway patent. Initial Plan of Care: All treatment areas with department are currently occupied. Plan to order/Initiate the following while awaiting opening in ED: labs and EKG.   Initiate Treatment-Testing, Proceed toTreatment Area When Bed Available for ED Attending/MLP to Continue Care    Electronically signed by Giovana Gustafson PA-C   DD: 1/16/23      Giovana Gustafson PA-C  01/16/23 [id number]

## 2023-01-18 ENCOUNTER — TELEPHONE (OUTPATIENT)
Dept: PRIMARY CARE CLINIC | Age: 43
End: 2023-01-18

## 2023-01-18 ENCOUNTER — HOSPITAL ENCOUNTER (EMERGENCY)
Age: 43
Discharge: HOME | End: 2023-01-18
Payer: COMMERCIAL

## 2023-01-18 ENCOUNTER — OFFICE VISIT (OUTPATIENT)
Dept: PRIMARY CARE CLINIC | Age: 43
End: 2023-01-18
Payer: COMMERCIAL

## 2023-01-18 VITALS
RESPIRATION RATE: 20 BRPM | HEIGHT: 65 IN | TEMPERATURE: 97.6 F | OXYGEN SATURATION: 98 % | BODY MASS INDEX: 19.83 KG/M2 | HEART RATE: 81 BPM | WEIGHT: 119 LBS | DIASTOLIC BLOOD PRESSURE: 78 MMHG | SYSTOLIC BLOOD PRESSURE: 112 MMHG

## 2023-01-18 DIAGNOSIS — E07.9 THYROID DISEASE: ICD-10-CM

## 2023-01-18 DIAGNOSIS — F41.9 ANXIETY: ICD-10-CM

## 2023-01-18 DIAGNOSIS — Z76.89 ESTABLISHING CARE WITH NEW DOCTOR, ENCOUNTER FOR: Primary | ICD-10-CM

## 2023-01-18 DIAGNOSIS — M25.50 ARTHRALGIA, UNSPECIFIED JOINT: ICD-10-CM

## 2023-01-18 DIAGNOSIS — R19.5 LOOSE STOOLS: Primary | ICD-10-CM

## 2023-01-18 DIAGNOSIS — F19.90 DRUG USE: ICD-10-CM

## 2023-01-18 DIAGNOSIS — B37.31 VAGINAL YEAST INFECTION: ICD-10-CM

## 2023-01-18 LAB
ALBUMIN SERPL-MCNC: 4.1 G/DL (ref 3.5–5.2)
ALP BLD-CCNC: 77 U/L (ref 35–104)
ALT SERPL-CCNC: 10 U/L (ref 0–32)
ANION GAP SERPL CALCULATED.3IONS-SCNC: 13 MMOL/L (ref 7–16)
AST SERPL-CCNC: 19 U/L (ref 0–31)
BASOPHILS ABSOLUTE: 0.07 E9/L (ref 0–0.2)
BASOPHILS RELATIVE PERCENT: 0.7 % (ref 0–2)
BILIRUB SERPL-MCNC: 0.4 MG/DL (ref 0–1.2)
BUN BLDV-MCNC: 8 MG/DL (ref 6–20)
CALCIUM SERPL-MCNC: 9.1 MG/DL (ref 8.6–10.2)
CHLORIDE BLD-SCNC: 102 MMOL/L (ref 98–107)
CO2: 25 MMOL/L (ref 22–29)
CREAT SERPL-MCNC: 0.8 MG/DL (ref 0.5–1)
EOSINOPHILS ABSOLUTE: 0.39 E9/L (ref 0.05–0.5)
EOSINOPHILS RELATIVE PERCENT: 4.1 % (ref 0–6)
GFR SERPL CREATININE-BSD FRML MDRD: >60 ML/MIN/1.73
GLUCOSE BLD-MCNC: 104 MG/DL (ref 74–99)
HCT VFR BLD CALC: 43.3 % (ref 34–48)
HEMOGLOBIN: 14.6 G/DL (ref 11.5–15.5)
IMMATURE GRANULOCYTES #: 0.03 E9/L
IMMATURE GRANULOCYTES %: 0.3 % (ref 0–5)
LYMPHOCYTES ABSOLUTE: 2.22 E9/L (ref 1.5–4)
LYMPHOCYTES RELATIVE PERCENT: 23.2 % (ref 20–42)
MCH RBC QN AUTO: 31.8 PG (ref 26–35)
MCHC RBC AUTO-ENTMCNC: 33.7 % (ref 32–34.5)
MCV RBC AUTO: 94.3 FL (ref 80–99.9)
MONOCYTES ABSOLUTE: 0.72 E9/L (ref 0.1–0.95)
MONOCYTES RELATIVE PERCENT: 7.5 % (ref 2–12)
NEUTROPHILS ABSOLUTE: 6.15 E9/L (ref 1.8–7.3)
NEUTROPHILS RELATIVE PERCENT: 64.2 % (ref 43–80)
PDW BLD-RTO: 12.7 FL (ref 11.5–15)
PLATELET # BLD: 344 E9/L (ref 130–450)
PMV BLD AUTO: 9.6 FL (ref 7–12)
POTASSIUM SERPL-SCNC: 4.2 MMOL/L (ref 3.5–5)
RBC # BLD: 4.59 E12/L (ref 3.5–5.5)
RHEUMATOID FACTOR: <10 IU/ML (ref 0–13)
SODIUM BLD-SCNC: 140 MMOL/L (ref 132–146)
T4 FREE: 1.04 NG/DL (ref 0.93–1.7)
TOTAL PROTEIN: 6.5 G/DL (ref 6.4–8.3)
TSH SERPL DL<=0.05 MIU/L-ACNC: 1.64 UIU/ML (ref 0.27–4.2)
WBC # BLD: 9.6 E9/L (ref 4.5–11.5)

## 2023-01-18 PROCEDURE — G8427 DOCREV CUR MEDS BY ELIG CLIN: HCPCS | Performed by: FAMILY MEDICINE

## 2023-01-18 PROCEDURE — G8484 FLU IMMUNIZE NO ADMIN: HCPCS | Performed by: FAMILY MEDICINE

## 2023-01-18 PROCEDURE — 4004F PT TOBACCO SCREEN RCVD TLK: CPT | Performed by: FAMILY MEDICINE

## 2023-01-18 PROCEDURE — G8420 CALC BMI NORM PARAMETERS: HCPCS | Performed by: FAMILY MEDICINE

## 2023-01-18 PROCEDURE — 99204 OFFICE O/P NEW MOD 45 MIN: CPT | Performed by: FAMILY MEDICINE

## 2023-01-18 RX ORDER — FLUCONAZOLE 150 MG/1
TABLET ORAL
Qty: 2 TABLET | Refills: 0 | Status: SHIPPED | OUTPATIENT
Start: 2023-01-18

## 2023-01-18 ASSESSMENT — ENCOUNTER SYMPTOMS
BLOOD IN STOOL: 0
SORE THROAT: 0
DIARRHEA: 0
RHINORRHEA: 0
CONSTIPATION: 0
COUGH: 0
BACK PAIN: 0
EYE REDNESS: 0
VOMITING: 0
ABDOMINAL PAIN: 0
WHEEZING: 0
SHORTNESS OF BREATH: 0
NAUSEA: 0
PHOTOPHOBIA: 0

## 2023-01-18 ASSESSMENT — PATIENT HEALTH QUESTIONNAIRE - PHQ9
SUM OF ALL RESPONSES TO PHQ QUESTIONS 1-9: 1
SUM OF ALL RESPONSES TO PHQ9 QUESTIONS 1 & 2: 1
SUM OF ALL RESPONSES TO PHQ QUESTIONS 1-9: 1
1. LITTLE INTEREST OR PLEASURE IN DOING THINGS: 1
SUM OF ALL RESPONSES TO PHQ QUESTIONS 1-9: 1
SUM OF ALL RESPONSES TO PHQ QUESTIONS 1-9: 1
2. FEELING DOWN, DEPRESSED OR HOPELESS: 0

## 2023-01-18 NOTE — PROGRESS NOTES
2023    Chief Complaint   Patient presents with    Established New Doctor    Other     Feels like there is a parasite in her blood stream, would like checked-when she wiped after going to bathroom looked like glass. Seen in ER would like to discuss labs results    Arm Pain     Left sided, feels like muscle pains        HPI  Gregory Root (:  1980) is a 43 y.o. female, here for evaluation of the following medical concerns:    Patient is a 77-year-old female who has not had a primary care physician in several years. Patient has been primarily seen through urgent cares or ERs. Controlled asthma with rare rescue inhaler use. History of thyroid disorder. No recent thyroid testing. Patient mainly presents to establish care but also has multiple acute complaints. Patient primarily believes that she has a parasitic infection and that all of her symptoms are secondary to such. Patient believes that she has had excretions from both her hands and near the groin area that are consistent with glass looking substance. Patient was seen in the emergency department recently regards these concerns. Patient was placed on antibiotics. Has started taking such. Reports random aches and pains throughout her body. Most recently she has had such in her fingers/hands. Patient reports multiple small cuts on her hands. Drug use: Patient does occasionally smoke marijuana and use crack. Most recent drug screen was also positive for fentanyl. Patient drinks alcohol. Smokes socially. Review of Systems   Constitutional:  Negative for chills and fever. HENT:  Negative for congestion, hearing loss, postnasal drip, rhinorrhea, sneezing, sore throat and tinnitus. Eyes:  Negative for photophobia and redness. Respiratory:  Negative for cough, shortness of breath and wheezing. Cardiovascular:  Negative for chest pain, palpitations and leg swelling.    Gastrointestinal:  Negative for abdominal pain, blood in stool, constipation, diarrhea, nausea and vomiting. Endocrine: Negative for polydipsia and polyuria. Genitourinary:  Negative for difficulty urinating, dysuria and hematuria. Musculoskeletal:  Positive for arthralgias. Negative for back pain. Skin:  Positive for rash. Neurological:  Negative for dizziness, weakness, light-headedness, numbness and headaches. Psychiatric/Behavioral:  The patient is not nervous/anxious. Prior to Visit Medications    Medication Sig Taking? Authorizing Provider   fluconazole (DIFLUCAN) 150 MG tablet Take 1 tablet today PO and then repeat the dose after you complete your antibiotics. Yes Sanjuana Mendez MD   cephALEXin (KEFLEX) 500 MG capsule Take 1 capsule by mouth 4 times daily for 7 days Yes Steff Kurtz PA-C   ibuprofen (ADVIL;MOTRIN) 800 MG tablet Take 1 tablet by mouth every 6 hours as needed for Pain  Juan Reddy, APRN - CNP        No Known Allergies    Past Medical History:   Diagnosis Date    Asthma     Thyroid disease         Menstrual History:  OB History          4    Para   2    Term                AB   1    Living             SAB   1    IAB        Ectopic        Molar        Multiple        Live Births                     Patient's last menstrual period was 2023 (approximate).          Past Surgical History:   Procedure Laterality Date    CYST REMOVAL      on thyroid    DENTAL SURGERY  2012    upper teeth extraction    DILATION AND CURETTAGE OF UTERUS      GANGLION CYST EXCISION      TUBAL LIGATION         Family History   Problem Relation Age of Onset    Asthma Mother     Hypertension Mother     Cerebral Aneurysm Mother     Cancer Maternal Grandmother         Lung    Asthma Maternal Grandmother     Hypertension Maternal Grandfather     Diabetes Maternal Grandfather        Immunization History   Administered Date(s) Administered    Tdap (Boostrix, Adacel) 2020       Health Maintenance   Topic Date Due    COVID-19 Vaccine (1) Never done    Varicella vaccine (1 of 2 - 2-dose childhood series) Never done    Pneumococcal 0-64 years Vaccine (1 - PCV) Never done    Depression Screen  Never done    Cervical cancer screen  01/09/2015    Lipids  Never done    Flu vaccine (1) Never done    DTaP/Tdap/Td vaccine (2 - Td or Tdap) 06/19/2030    Hepatitis C screen  Completed    HIV screen  Completed    Hepatitis A vaccine  Aged Out    Hib vaccine  Aged Out    Meningococcal (ACWY) vaccine  Aged Out       Social History     Socioeconomic History    Marital status: Single     Spouse name: Not on file    Number of children: Not on file    Years of education: Not on file    Highest education level: Not on file   Occupational History    Not on file   Tobacco Use    Smoking status: Some Days     Packs/day: 1.00     Types: Cigarettes    Smokeless tobacco: Never   Substance and Sexual Activity    Alcohol use: Yes     Comment: rare    Drug use: Yes     Types: Cocaine, Marijuana (Weed)    Sexual activity: Not on file   Other Topics Concern    Not on file   Social History Narrative    ** Merged History Encounter **          Social Determinants of Health     Financial Resource Strain: Not on file   Food Insecurity: Not on file   Transportation Needs: Not on file   Physical Activity: Not on file   Stress: Not on file   Social Connections: Not on file   Intimate Partner Violence: Not on file   Housing Stability: Not on file         Vitals:    01/18/23 0857   BP: 112/78   Pulse: 81   Resp: 20   Temp: 97.6 °F (36.4 °C)   TempSrc: Temporal   SpO2: 98%   Weight: 119 lb (54 kg)   Height: 5' 5\" (1.651 m)       Estimated body mass index is 19.8 kg/m² as calculated from the following:    Height as of this encounter: 5' 5\" (1.651 m). Weight as of this encounter: 119 lb (54 kg). Physical Exam  Vitals and nursing note reviewed. Constitutional:       Appearance: She is well-developed. HENT:      Head: Normocephalic.       Right Ear: Tympanic membrane and external ear normal.      Left Ear: Tympanic membrane and external ear normal.   Eyes:      Extraocular Movements: Extraocular movements intact. Conjunctiva/sclera: Conjunctivae normal.      Pupils: Pupils are equal, round, and reactive to light. Neck:      Trachea: Trachea normal.   Cardiovascular:      Rate and Rhythm: Normal rate and regular rhythm. Pulses:           Radial pulses are 2+ on the right side and 2+ on the left side. Posterior tibial pulses are 2+ on the right side and 2+ on the left side. Heart sounds: Normal heart sounds. No murmur heard. Pulmonary:      Effort: Pulmonary effort is normal. No respiratory distress. Breath sounds: Normal breath sounds. No decreased breath sounds, wheezing or rales. Abdominal:      General: Bowel sounds are normal. There is no distension. Palpations: Abdomen is soft. Tenderness: There is no abdominal tenderness. There is no rebound. Musculoskeletal:         General: Normal range of motion. Cervical back: Neck supple. Right lower leg: No edema. Left lower leg: No edema. Skin:     General: Skin is warm and dry. Findings: No rash. Comments: Multiple scratches and small cluster on the patient's exposed skin. Brief exam with chaperone (MA) show signs of a possible yeast infection around the vulvar region. Neurological:      Mental Status: She is alert and oriented to person, place, and time. Cranial Nerves: No cranial nerve deficit. Sensory: No sensory deficit. Deep Tendon Reflexes:      Reflex Scores:       Patellar reflexes are 2+ on the right side and 2+ on the left side.   Psychiatric:         Mood and Affect: Mood normal.         Behavior: Behavior normal.       Patient Active Problem List    Diagnosis Date Noted    History of CVA (cerebrovascular accident) 02/28/2020    Drug overdoses, undetermined intent, initial encounter 07/45/4553    Metabolic acidosis 02/28/2020         ASSESSMENT/PLAN:  Isela Barreto was seen today for established new doctor, other and arm pain. Diagnoses and all orders for this visit:    Establishing care with new doctor, encounter for  Patient's chart was reviewed and updated at length. Arthralgia, unspecified joint  -     CBC with Auto Differential; Future  -     Comprehensive Metabolic Panel; Future  -     CATHERINE; Future  -     Rheumatoid Factor; Future  Random aches and pains per patient's report. Primarily in her hands and shoulders at times. We will check for autoimmune issues. Otherwise consider physical therapy and/or chiropractic care. Drug use  Patient does admit to using marijuana, crack/cocaine. Patient most recently had a drug screen that was positive for such and also fentanyl. Patient should consider drug rehab. Thyroid disease  -     TSH; Future  -     T4, Free; Future    Anxiety  Patient primarily anxious over her concerning findings as noted in HPI. Patient may need to see psychiatry in the future. Patient does not have any overt and obvious hallucinations today. Vaginal yeast infection  -     fluconazole (DIFLUCAN) 150 MG tablet; Take 1 tablet today PO and then repeat the dose after you complete your antibiotics. Patient advised on above medication. Patient to establish with a OB/GYN provider within the next few months. Health Maintenance reviewed    Return in about 6 months (around 7/18/2023) for Establish with new PCP. Educational materials and/or home exercises printed for patient's review and were included in patient instructions on his/her After Visit Summary and given to patient at the end of visit. Counseled regarding above diagnosis, including possible risks and complications,  especially if left uncontrolled.      Counseled regarding the possible side effects, risks, benefits and alternatives to treatment; patient and/or guardianverbalizes understanding, agrees, feels comfortable with and wishes to proceed with above treatment plan. Advised patient to call with any new medication issues, and read all Rx info from pharmacy to assure aware of all possible risks and side effects of medication before taking. Reviewed age and gender appropriate health screening exams and vaccinations. Advised patient regarding importance of keeping up withrecommended health maintenance and to schedule as soon as possible if overdue, as this is important in assessing for undiagnosed pathology, especially cancer, as well as protecting against potentially harmful/lifethreatening disease. Patient and/or guardian verbalizes understanding and agrees with abovecounseling, assessment and plan. All questions answered. An  electronic signature was used to authenticatethis note.     --Dex Enrique MD on 1/18/23 at 8:01 AM EST

## 2023-01-18 NOTE — TELEPHONE ENCOUNTER
The pt is calling because when she went home after her appointment with you she had a bowel movement and there was something like worms in the toilet, she says she took pictures and a video so you can see them

## 2023-01-19 ENCOUNTER — HOSPITAL ENCOUNTER (OUTPATIENT)
Age: 43
Setting detail: SPECIMEN
Discharge: HOME | End: 2023-01-19
Payer: COMMERCIAL

## 2023-01-19 DIAGNOSIS — R19.5 LOOSE STOOLS: ICD-10-CM

## 2023-01-19 LAB — ANTI-NUCLEAR ANTIBODY (ANA): NEGATIVE

## 2023-01-19 PROCEDURE — 87045 FECES CULTURE AEROBIC BACT: CPT

## 2023-01-19 PROCEDURE — 82705 FATS/LIPIDS FECES QUAL: CPT

## 2023-01-19 PROCEDURE — 87329 GIARDIA AG IA: CPT

## 2023-01-19 PROCEDURE — 87328 CRYPTOSPORIDIUM AG IA: CPT

## 2023-01-19 PROCEDURE — 82710 FATS/LIPIDS FECES QUANT: CPT

## 2023-01-19 PROCEDURE — 89055 LEUKOCYTE ASSESSMENT FECAL: CPT

## 2023-01-20 ENCOUNTER — HOSPITAL ENCOUNTER (EMERGENCY)
Age: 43
Discharge: HOME | End: 2023-01-20
Attending: EMERGENCY MEDICINE
Payer: COMMERCIAL

## 2023-01-20 VITALS
HEART RATE: 98 BPM | SYSTOLIC BLOOD PRESSURE: 130 MMHG | DIASTOLIC BLOOD PRESSURE: 69 MMHG | TEMPERATURE: 98.7 F | OXYGEN SATURATION: 100 % | RESPIRATION RATE: 18 BRPM

## 2023-01-20 DIAGNOSIS — K62.5 HEMORRHAGE OF RECTUM AND ANUS: Primary | ICD-10-CM

## 2023-01-20 LAB
ABO/RH: NORMAL
ANION GAP SERPL CALCULATED.3IONS-SCNC: 9 MMOL/L (ref 7–16)
ANTIBODY SCREEN: NORMAL
BACTERIA: ABNORMAL /HPF
BASOPHILS ABSOLUTE: 0.08 E9/L (ref 0–0.2)
BASOPHILS RELATIVE PERCENT: 1 % (ref 0–2)
BILIRUBIN URINE: NEGATIVE
BLOOD, URINE: ABNORMAL
BUN BLDV-MCNC: 6 MG/DL (ref 6–20)
CALCIUM SERPL-MCNC: 8.6 MG/DL (ref 8.6–10.2)
CHLORIDE BLD-SCNC: 105 MMOL/L (ref 98–107)
CLARITY: CLEAR
CO2: 25 MMOL/L (ref 22–29)
COLOR: YELLOW
CREAT SERPL-MCNC: 0.7 MG/DL (ref 0.5–1)
CRYPTOSPORIDIUM ANTIGEN STOOL: NORMAL
EOSINOPHILS ABSOLUTE: 0.32 E9/L (ref 0.05–0.5)
EOSINOPHILS RELATIVE PERCENT: 4.1 % (ref 0–6)
EPITHELIAL CELLS, UA: ABNORMAL /HPF
GFR SERPL CREATININE-BSD FRML MDRD: >60 ML/MIN/1.73
GIARDIA ANTIGEN STOOL: NORMAL
GLUCOSE BLD-MCNC: 113 MG/DL (ref 74–99)
GLUCOSE URINE: NEGATIVE MG/DL
HCG(URINE) PREGNANCY TEST: NEGATIVE
HCT VFR BLD CALC: 42.9 % (ref 34–48)
HEMOGLOBIN: 14.5 G/DL (ref 11.5–15.5)
IMMATURE GRANULOCYTES #: 0.03 E9/L
IMMATURE GRANULOCYTES %: 0.4 % (ref 0–5)
KETONES, URINE: NEGATIVE MG/DL
LACTIC ACID: 1.9 MMOL/L (ref 0.5–2.2)
LEUKOCYTE ESTERASE, URINE: NEGATIVE
LIPASE: 14 U/L (ref 13–60)
LYMPHOCYTES ABSOLUTE: 2.53 E9/L (ref 1.5–4)
LYMPHOCYTES RELATIVE PERCENT: 32.4 % (ref 20–42)
MCH RBC QN AUTO: 31.5 PG (ref 26–35)
MCHC RBC AUTO-ENTMCNC: 33.8 % (ref 32–34.5)
MCV RBC AUTO: 93.3 FL (ref 80–99.9)
MONOCYTES ABSOLUTE: 0.37 E9/L (ref 0.1–0.95)
MONOCYTES RELATIVE PERCENT: 4.7 % (ref 2–12)
NEUTROPHILS ABSOLUTE: 4.48 E9/L (ref 1.8–7.3)
NEUTROPHILS RELATIVE PERCENT: 57.4 % (ref 43–80)
NITRITE, URINE: NEGATIVE
PDW BLD-RTO: 12.7 FL (ref 11.5–15)
PH UA: 6.5 (ref 5–9)
PLATELET # BLD: 364 E9/L (ref 130–450)
PMV BLD AUTO: 9.2 FL (ref 7–12)
POTASSIUM SERPL-SCNC: 3.4 MMOL/L (ref 3.5–5)
PROTEIN UA: NEGATIVE MG/DL
RBC # BLD: 4.6 E12/L (ref 3.5–5.5)
RBC UA: ABNORMAL /HPF (ref 0–2)
SODIUM BLD-SCNC: 139 MMOL/L (ref 132–146)
SPECIFIC GRAVITY UA: <=1.005 (ref 1–1.03)
UROBILINOGEN, URINE: 0.2 E.U./DL
WBC # BLD: 7.8 E9/L (ref 4.5–11.5)
WBC UA: ABNORMAL /HPF (ref 0–5)
WHITE BLOOD CELLS (WBC), STOOL: NORMAL

## 2023-01-20 PROCEDURE — 86900 BLOOD TYPING SEROLOGIC ABO: CPT

## 2023-01-20 PROCEDURE — 96374 THER/PROPH/DIAG INJ IV PUSH: CPT

## 2023-01-20 PROCEDURE — 83605 ASSAY OF LACTIC ACID: CPT

## 2023-01-20 PROCEDURE — 2580000003 HC RX 258

## 2023-01-20 PROCEDURE — A4216 STERILE WATER/SALINE, 10 ML: HCPCS

## 2023-01-20 PROCEDURE — 99284 EMERGENCY DEPT VISIT MOD MDM: CPT

## 2023-01-20 PROCEDURE — 83690 ASSAY OF LIPASE: CPT

## 2023-01-20 PROCEDURE — 81025 URINE PREGNANCY TEST: CPT

## 2023-01-20 PROCEDURE — 85025 COMPLETE CBC W/AUTO DIFF WBC: CPT

## 2023-01-20 PROCEDURE — 81001 URINALYSIS AUTO W/SCOPE: CPT

## 2023-01-20 PROCEDURE — 80048 BASIC METABOLIC PNL TOTAL CA: CPT

## 2023-01-20 PROCEDURE — C9113 INJ PANTOPRAZOLE SODIUM, VIA: HCPCS

## 2023-01-20 PROCEDURE — 6360000002 HC RX W HCPCS

## 2023-01-20 PROCEDURE — 86901 BLOOD TYPING SEROLOGIC RH(D): CPT

## 2023-01-20 PROCEDURE — 86850 RBC ANTIBODY SCREEN: CPT

## 2023-01-20 RX ORDER — 0.9 % SODIUM CHLORIDE 0.9 %
1000 INTRAVENOUS SOLUTION INTRAVENOUS ONCE
Status: COMPLETED | OUTPATIENT
Start: 2023-01-20 | End: 2023-01-20

## 2023-01-20 RX ADMIN — SODIUM CHLORIDE 1000 ML: 9 INJECTION, SOLUTION INTRAVENOUS at 07:41

## 2023-01-20 RX ADMIN — SODIUM CHLORIDE 40 MG: 9 INJECTION, SOLUTION INTRAMUSCULAR; INTRAVENOUS; SUBCUTANEOUS at 07:41

## 2023-01-20 ASSESSMENT — LIFESTYLE VARIABLES
HOW MANY STANDARD DRINKS CONTAINING ALCOHOL DO YOU HAVE ON A TYPICAL DAY: PATIENT DOES NOT DRINK
HOW OFTEN DO YOU HAVE A DRINK CONTAINING ALCOHOL: NEVER

## 2023-01-20 NOTE — DISCHARGE INSTRUCTIONS
Please return to the ED if symptoms worsen, persist, recur. Please take all your medications as prescribed. Please follow-up with PCP and general surgery  as discussed.

## 2023-01-20 NOTE — ED PROVIDER NOTES
[facility]  Department of Emergency Medicine     Written by: Ming Shea MD  Patient Name: Shavonne Haskins  Attending Provider: Holli James DO  Admit Date: 2023  6:44 AM  MRN: [id number]                   : 1980        Chief Complaint   Patient presents with    Other     Pt states,\"small pieces of glass continue to come out of my wounds and I have worms. \"    - Chief complaint    71-year-old female with history of asthma, thyroid disease that presents to the ED with complaints of rectal bleeding, abdominal cramping. Patient states that 3 weeks ago, she injured her left hand left ring finger while washing dishes and 1 broke. She had a laceration from it. She came to the ER, and was given Keflex. The pain states that 2 days ago she developed abdominal cramping, similar to her period cramps. The patient states that her low menstrual period did end about 2 weeks ago. The patient states that yesterday and today, she noted some blood from her rectum possibly, went to her primary care and had a stool study performed. It is currently in process. However the continued rectal bleeding prompted her to come to the ER to be evaluated. The patient denies fevers, chills, diaphoresis. She says that she could be on her period right now. She did have a tubal ligation years ago, states that she cannot be pregnant. She denies any nausea or vomiting. No chest pain or shortness of breath. Review of Systems   Constitutional:  Negative for appetite change, chills, diaphoresis and fever. HENT:  Negative for ear discharge, ear pain, facial swelling, sneezing and sore throat. Eyes:  Negative for photophobia and pain. Respiratory:  Negative for cough, choking and shortness of breath. Cardiovascular:  Negative for chest pain and palpitations. Gastrointestinal:  Positive for abdominal pain and anal bleeding. Negative for constipation, diarrhea, nausea and vomiting. Genitourinary:  Positive for vaginal bleeding. Negative for dysuria, enuresis, flank pain, frequency and hematuria. Musculoskeletal:  Negative for arthralgias, back pain, joint swelling, myalgias and neck pain. Skin:  Negative for pallor and rash. Neurological:  Negative for weakness, light-headedness and headaches. Physical Exam  Constitutional:       General: She is not in acute distress. Appearance: Normal appearance. She is not ill-appearing. HENT:      Head: Normocephalic and atraumatic. Nose: Nose normal. No congestion. Mouth/Throat:      Mouth: Mucous membranes are moist.      Pharynx: No posterior oropharyngeal erythema. Eyes:      General: No scleral icterus. Extraocular Movements: Extraocular movements intact. Pupils: Pupils are equal, round, and reactive to light. Cardiovascular:      Rate and Rhythm: Normal rate and regular rhythm. Pulses: Normal pulses. Heart sounds: Normal heart sounds. Pulmonary:      Effort: Pulmonary effort is normal. No respiratory distress. Breath sounds: Normal breath sounds. No stridor. No wheezing or rhonchi. Chest:      Chest wall: No tenderness. Abdominal:      General: Bowel sounds are normal. There is no distension. Palpations: Abdomen is soft. There is no mass. Tenderness: There is no abdominal tenderness. Genitourinary:     General: Normal vulva. Vagina: No vaginal discharge. Rectum: Normal. Guaiac result positive. Musculoskeletal:         General: No swelling, tenderness or deformity. Normal range of motion. Cervical back: Normal range of motion. No rigidity or tenderness. Skin:     Capillary Refill: Capillary refill takes less than 2 seconds. Coloration: Skin is not jaundiced or pale. Findings: No erythema. Neurological:      General: No focal deficit present. Mental Status: She is alert and oriented to person, place, and time. Mental status is at baseline. Procedures       MDM  Number of Diagnoses or Management Options  Hemorrhage of rectum and anus  Diagnosis management comments: This is a 70-year-old female with history of asthma, thyroid disease that presents to the ED with complaints of rectal bleeding, abdominal cramping. Patient states that 3 weeks ago, she injured her left hand left ring finger while washing dishes and 1 broke. She had a laceration from it. She came to the ER, and was given Keflex. The pain states that 2 days ago she developed abdominal cramping, similar to her period cramps. The patient states that her low menstrual period did end about 2 weeks ago. The patient states that yesterday and today, she noted some blood from her rectum possibly, went to her primary care and had a stool study performed. It is currently in process. However the continued rectal bleeding prompted her to come to the ER to be evaluated. The patient denies fevers, chills, diaphoresis. She says that she could be on her period right now. She did have a tubal ligation years ago, states that she cannot be pregnant. She denies any nausea or vomiting. No chest pain or shortness of breath. The patient here in the ED is not hypoxic and hemodynamically stable, and does not appear to be in acute distress. They are calm, alert, oriented, and pleasant to speak with. The patient has clear lungs to auscultation, no abnormal heart murmurs are heard. On abdominal palpation, the patient has no significant abdominal tenderness to any quadrant. There are no signs of jaundice or scleral icterus. No visible hernias on exam.    Upon chart review, patient was admitted by Dr. Rosa Tam for opioid overdose on February 27, 2020. Patient was discharged and advised to stop using drugs. CBC is ordered to evaluate for any signs of infection or inflammation by obtaining a WBC count, or any signs of acute anemia by interpreting hemoglobin.  A metabolic panel with electrolytes was ordered to evaluate for any electrolyte imbalances, kidney function, or any elevations in anion gap. Urinalysis ordered to evaluate for UTI as the possible cause of symptoms, and may also evaluate hematuria as well. Lactic acid levels were ordered to evaluate for signs of ischemia or decrease perfusion to organ systems. Lipase levels were ordered to evaluate for possible elevations which suggest pancreatic etiology of symptoms. POCT pregnancy test ordered to evaluate immediately pregnancy status of patient. Type and screen ordered to determine patient blood type due to possible hemorrhage requiring eventual transfusion if increased or persistent. Will review lab results prior to ordering imaging studies. Patient will be given 0.9% saline 1 L IV, and 40 mg Protonix IV. Remainder of MDM will be in the ED course below. ED Course as of 01/21/23 0256   Fri Jan 20, 2023   0718 Hemoccult with RN at bedside is grossly positive for blood. Pelvic exam reveals dark red blood in the vaginal canal, without any active bleeding. No signs of scars, masses. No abnormal anatomy. No significant pain with exam.  Largely unremarkable. [AH]   0902 Patient urinalysis reveals blood only. No electrolyte imbalances, normal lactic acid levels and lipase. Patient is not pregnant. Type and screen reveals O+ blood type. Antibody is negative. No elevation white count, no acute anemia with hemoglobin 14.5. [AH]   9559 Patient remained vitally stable, in no acute distress. Due to abdominal cramping, and likelihood of blood being menstrual in nature, patient will be discharged with return precautions. She is also advised to follow-up with general surgery in case she continues to bleed.  []      ED Course User Index  [] Niles Velasquez MD       --------------------------------------------- PAST HISTORY ---------------------------------------------  Past Medical History:  has a past medical history of Asthma and Thyroid disease. Past Surgical History:  has a past surgical history that includes Dental surgery (05/02/2012); cyst removal; Dilation and curettage of uterus; Tubal ligation; and Ganglion cyst excision. Social History:  reports that she has been smoking cigarettes. She has been smoking an average of 1 pack per day. She has never used smokeless tobacco. She reports current alcohol use. She reports current drug use. Drugs: Cocaine and Marijuana (Ellen Garza). Family History: family history includes Asthma in her maternal grandmother and mother; Cancer in her maternal grandmother; Cerebral Aneurysm in her mother; Diabetes in her maternal grandfather; Hypertension in her maternal grandfather and mother. The patients home medications have been reviewed. Allergies: Patient has no known allergies.     -------------------------------------------------- RESULTS -------------------------------------------------  Labs:  Results for orders placed or performed during the hospital encounter of 01/20/23   CBC with Auto Differential   Result Value Ref Range    WBC 7.8 4.5 - 11.5 E9/L    RBC 4.60 3.50 - 5.50 E12/L    Hemoglobin 14.5 11.5 - 15.5 g/dL    Hematocrit 42.9 34.0 - 48.0 %    MCV 93.3 80.0 - 99.9 fL    MCH 31.5 26.0 - 35.0 pg    MCHC 33.8 32.0 - 34.5 %    RDW 12.7 11.5 - 15.0 fL    Platelets 374 185 - 038 E9/L    MPV 9.2 7.0 - 12.0 fL    Neutrophils % 57.4 43.0 - 80.0 %    Immature Granulocytes % 0.4 0.0 - 5.0 %    Lymphocytes % 32.4 20.0 - 42.0 %    Monocytes % 4.7 2.0 - 12.0 %    Eosinophils % 4.1 0.0 - 6.0 %    Basophils % 1.0 0.0 - 2.0 %    Neutrophils Absolute 4.48 1.80 - 7.30 E9/L    Immature Granulocytes # 0.03 E9/L    Lymphocytes Absolute 2.53 1.50 - 4.00 E9/L    Monocytes Absolute 0.37 0.10 - 0.95 E9/L    Eosinophils Absolute 0.32 0.05 - 0.50 E9/L    Basophils Absolute 0.08 0.00 - 0.20 E9/L   BMP   Result Value Ref Range    Sodium 139 132 - 146 mmol/L    Potassium 3.4 (L) 3.5 - 5.0 mmol/L    Chloride 105 98 - 107 mmol/L    CO2 25 22 - 29 mmol/L    Anion Gap 9 7 - 16 mmol/L    Glucose 113 (H) 74 - 99 mg/dL    BUN 6 6 - 20 mg/dL    Creatinine 0.7 0.5 - 1.0 mg/dL    Est, Glom Filt Rate >60 >=60 mL/min/1.73    Calcium 8.6 8.6 - 10.2 mg/dL   Lipase   Result Value Ref Range    Lipase 14 13 - 60 U/L   Lactic Acid   Result Value Ref Range    Lactic Acid 1.9 0.5 - 2.2 mmol/L   Urinalysis   Result Value Ref Range    Color, UA Yellow Straw/Yellow    Clarity, UA Clear Clear    Glucose, Ur Negative Negative mg/dL    Bilirubin Urine Negative Negative    Ketones, Urine Negative Negative mg/dL    Specific Gravity, UA <=1.005 1.005 - 1.030    Blood, Urine LARGE (A) Negative    pH, UA 6.5 5.0 - 9.0    Protein, UA Negative Negative mg/dL    Urobilinogen, Urine 0.2 <2.0 E.U./dL    Nitrite, Urine Negative Negative    Leukocyte Esterase, Urine Negative Negative   Microscopic Urinalysis   Result Value Ref Range    WBC, UA NONE 0 - 5 /HPF    RBC, UA 1-3 0 - 2 /HPF    Epithelial Cells, UA MODERATE /HPF    Bacteria, UA MANY (A) None Seen /HPF   Pregnancy, Urine   Result Value Ref Range    HCG(Urine) Pregnancy Test NEGATIVE NEGATIVE   TYPE AND SCREEN   Result Value Ref Range    ABO/Rh O POS     Antibody Screen NEG        Radiology:  No orders to display     Medications   0.9 % sodium chloride bolus (0 mLs IntraVENous Stopped 1/20/23 0914)   pantoprazole (PROTONIX) 40 mg in sodium chloride (PF) 0.9 % 10 mL injection (40 mg IntraVENous Given 1/20/23 0741)       ------------------------- NURSING NOTES AND VITALS REVIEWED ---------------------------  Date / Time Roomed:  1/20/2023  6:44 AM  ED Bed Assignment:  20/20    The nursing notes within the ED encounter and vital signs as below have been reviewed.    /69   Pulse 98   Temp 98.7 °F (37.1 °C) (Oral)   Resp 18   LMP 01/02/2023 (Approximate) Comment: Had period 2x last month  SpO2 100%   Oxygen Saturation Interpretation: Normal      ------------------------------------------ PROGRESS NOTES ------------------------------------------  I have spoken with the patient and discussed todays results, in addition to providing specific details for the plan of care and counseling regarding the diagnosis and prognosis. Their questions are answered at this time and they are agreeable with the plan. I discussed at length with them reasons for immediate return here for re evaluation. They will followup with primary care by calling their office tomorrow. --------------------------------- ADDITIONAL PROVIDER NOTES ---------------------------------  At this time the patient is without objective evidence of an acute process requiring hospitalization or inpatient management. They have remained hemodynamically stable throughout their entire ED visit and are stable for discharge with outpatient follow-up. The plan has been discussed in detail and they are aware of the specific conditions for emergent return, as well as the importance of follow-up. Discharge Medication List as of 1/20/2023  9:05 AM          Diagnosis:  1. Hemorrhage of rectum and anus        Disposition:  Patient's disposition: Discharge to home  Patient's condition is stable. Patient was seen and evaluated by myself and my attending Leah Baxter DO. Assessment and Plan discussed with attending provider, please see attestation for final plan of care.      MD Terese Washington MD  Resident  01/21/23 3911

## 2023-01-21 LAB — CULTURE, STOOL: NORMAL

## 2023-01-21 ASSESSMENT — ENCOUNTER SYMPTOMS
ABDOMINAL PAIN: 1
SORE THROAT: 0
PHOTOPHOBIA: 0
CHOKING: 0
DIARRHEA: 0
EYE PAIN: 0
FACIAL SWELLING: 0
VOMITING: 0
COUGH: 0
ANAL BLEEDING: 1
CONSTIPATION: 0
SHORTNESS OF BREATH: 0
NAUSEA: 0
BACK PAIN: 0

## 2023-01-23 DIAGNOSIS — R19.5 LOOSE STOOLS: Primary | ICD-10-CM

## 2023-01-29 ENCOUNTER — HOSPITAL ENCOUNTER (INPATIENT)
Age: 43
LOS: 3 days | Discharge: HOME | DRG: 812 | End: 2023-02-01
Attending: EMERGENCY MEDICINE | Admitting: FAMILY MEDICINE
Payer: COMMERCIAL

## 2023-01-29 ENCOUNTER — APPOINTMENT (OUTPATIENT)
Dept: GENERAL RADIOLOGY | Age: 43
DRG: 812 | End: 2023-01-29
Payer: COMMERCIAL

## 2023-01-29 ENCOUNTER — APPOINTMENT (OUTPATIENT)
Dept: CT IMAGING | Age: 43
DRG: 812 | End: 2023-01-29
Payer: COMMERCIAL

## 2023-01-29 DIAGNOSIS — R40.2432 GLASGOW COMA SCALE TOTAL SCORE 3-8, AT ARRIVAL TO EMERGENCY DEPARTMENT (HCC): Primary | ICD-10-CM

## 2023-01-29 DIAGNOSIS — R73.9 HYPERGLYCEMIA: ICD-10-CM

## 2023-01-29 DIAGNOSIS — R79.89 ELEVATED LACTIC ACID LEVEL: ICD-10-CM

## 2023-01-29 DIAGNOSIS — G93.40 ENCEPHALOPATHY ACUTE: ICD-10-CM

## 2023-01-29 DIAGNOSIS — T17.908A ASPIRATION INTO AIRWAY, INITIAL ENCOUNTER: ICD-10-CM

## 2023-01-29 DIAGNOSIS — T68.XXXA HYPOTHERMIA, INITIAL ENCOUNTER: ICD-10-CM

## 2023-01-29 DIAGNOSIS — E87.20 ACIDOSIS: ICD-10-CM

## 2023-01-29 DIAGNOSIS — T40.604A OPIATE OVERDOSE, UNDETERMINED INTENT, INITIAL ENCOUNTER (HCC): ICD-10-CM

## 2023-01-29 DIAGNOSIS — R94.31 QT PROLONGATION: ICD-10-CM

## 2023-01-29 DIAGNOSIS — F19.10 POLYSUBSTANCE ABUSE (HCC): ICD-10-CM

## 2023-01-29 PROBLEM — T50.904A DRUG OVERDOSE OF UNDETERMINED INTENT, INITIAL ENCOUNTER: Status: ACTIVE | Noted: 2023-01-29

## 2023-01-29 LAB
AADO2: 144 MMHG
ACETAMINOPHEN LEVEL: <5 MCG/ML (ref 10–30)
ALBUMIN SERPL-MCNC: 4 G/DL (ref 3.5–5.2)
ALP BLD-CCNC: 76 U/L (ref 35–104)
ALT SERPL-CCNC: 15 U/L (ref 0–32)
AMPHETAMINE SCREEN, URINE: NOT DETECTED
ANION GAP SERPL CALCULATED.3IONS-SCNC: 17 MMOL/L (ref 7–16)
AST SERPL-CCNC: 26 U/L (ref 0–31)
B.E.: -4 MMOL/L (ref -3–3)
B.E.: -7.1 MMOL/L (ref -3–3)
BARBITURATE SCREEN URINE: NOT DETECTED
BASOPHILS ABSOLUTE: 0.09 E9/L (ref 0–0.2)
BASOPHILS RELATIVE PERCENT: 0.7 % (ref 0–2)
BENZODIAZEPINE SCREEN, URINE: NOT DETECTED
BETA-HYDROXYBUTYRATE: 0.15 MMOL/L (ref 0.02–0.27)
BILIRUB SERPL-MCNC: 0.5 MG/DL (ref 0–1.2)
BILIRUBIN URINE: NEGATIVE
BLOOD, URINE: NEGATIVE
BUN BLDV-MCNC: 10 MG/DL (ref 6–20)
CALCIUM SERPL-MCNC: 8.6 MG/DL (ref 8.6–10.2)
CANNABINOID SCREEN URINE: POSITIVE
CHLORIDE BLD-SCNC: 101 MMOL/L (ref 98–107)
CLARITY: CLEAR
CO2: 23 MMOL/L (ref 22–29)
COCAINE METABOLITE SCREEN URINE: POSITIVE
COHB: 1.5 % (ref 0–1.5)
COHB: 3.3 % (ref 0–1.5)
COLOR: YELLOW
CREAT SERPL-MCNC: 1 MG/DL (ref 0.5–1)
CRITICAL: ABNORMAL
CRITICAL: ABNORMAL
DATE ANALYZED: ABNORMAL
DATE ANALYZED: ABNORMAL
DATE OF COLLECTION: ABNORMAL
DATE OF COLLECTION: ABNORMAL
EOSINOPHILS ABSOLUTE: 0.36 E9/L (ref 0.05–0.5)
EOSINOPHILS RELATIVE PERCENT: 2.9 % (ref 0–6)
ETHANOL: <10 MG/DL (ref 0–0.08)
FENTANYL SCREEN, URINE: POSITIVE
FIO2: 100 %
FIO2: 50 %
FOLATE: 15 NG/ML (ref 4.8–24.2)
GFR SERPL CREATININE-BSD FRML MDRD: >60 ML/MIN/1.73
GLUCOSE BLD-MCNC: 343 MG/DL (ref 74–99)
GLUCOSE URINE: 500 MG/DL
HCG(URINE) PREGNANCY TEST: NEGATIVE
HCO3: 21.1 MMOL/L (ref 22–26)
HCO3: 21.1 MMOL/L (ref 22–26)
HCT VFR BLD CALC: 43.6 % (ref 34–48)
HEMOGLOBIN: 13.9 G/DL (ref 11.5–15.5)
HHB: 0.4 % (ref 0–5)
HHB: 1.3 % (ref 0–5)
IMMATURE GRANULOCYTES #: 0.1 E9/L
IMMATURE GRANULOCYTES %: 0.8 % (ref 0–5)
KETONES, URINE: NEGATIVE MG/DL
LAB: ABNORMAL
LAB: ABNORMAL
LACTIC ACID, SEPSIS: 5.7 MMOL/L (ref 0.5–1.9)
LACTIC ACID: 1.1 MMOL/L (ref 0.5–2.2)
LEUKOCYTE ESTERASE, URINE: NEGATIVE
LYMPHOCYTES ABSOLUTE: 5.28 E9/L (ref 1.5–4)
LYMPHOCYTES RELATIVE PERCENT: 42.1 % (ref 20–42)
Lab: ABNORMAL
MAGNESIUM: 2.5 MG/DL (ref 1.6–2.6)
MCH RBC QN AUTO: 30.6 PG (ref 26–35)
MCHC RBC AUTO-ENTMCNC: 31.9 % (ref 32–34.5)
MCV RBC AUTO: 96 FL (ref 80–99.9)
METHADONE SCREEN, URINE: NOT DETECTED
METHB: 0.3 % (ref 0–1.5)
METHB: 0.4 % (ref 0–1.5)
MODE: AC
MODE: AC
MONOCYTES ABSOLUTE: 0.37 E9/L (ref 0.1–0.95)
MONOCYTES RELATIVE PERCENT: 3 % (ref 2–12)
NEUTROPHILS ABSOLUTE: 6.34 E9/L (ref 1.8–7.3)
NEUTROPHILS RELATIVE PERCENT: 50.5 % (ref 43–80)
NITRITE, URINE: NEGATIVE
O2 SATURATION: 98.7 % (ref 92–98.5)
O2 SATURATION: 99.6 % (ref 92–98.5)
O2HB: 96 % (ref 94–97)
O2HB: 96.8 % (ref 94–97)
OPERATOR ID: 1868
OPERATOR ID: 2593
OPIATE SCREEN URINE: NOT DETECTED
OSMOLALITY: 286 MOSM/KG (ref 285–310)
OXYCODONE URINE: NOT DETECTED
PATIENT TEMP: 37 C
PATIENT TEMP: 37 C
PCO2: 38.6 MMHG (ref 35–45)
PCO2: 53.1 MMHG (ref 35–45)
PDW BLD-RTO: 13 FL (ref 11.5–15)
PEEP/CPAP: 5 CMH2O
PEEP/CPAP: 5 CMH2O
PFO2: 3.13 MMHG/%
PH BLOOD GAS: 7.22 (ref 7.35–7.45)
PH BLOOD GAS: 7.36 (ref 7.35–7.45)
PH UA: 6 (ref 5–9)
PHENCYCLIDINE SCREEN URINE: NOT DETECTED
PLATELET # BLD: 364 E9/L (ref 130–450)
PMV BLD AUTO: 9.2 FL (ref 7–12)
PO2: 156.6 MMHG (ref 75–100)
PO2: >500 MMHG (ref 75–100)
POTASSIUM SERPL-SCNC: 3.5 MMOL/L (ref 3.5–5)
PRO-BNP: 141 PG/ML (ref 0–125)
PROTEIN UA: NEGATIVE MG/DL
RBC # BLD: 4.54 E12/L (ref 3.5–5.5)
RI(T): 0.92
RR MECHANICAL: 20 B/MIN
RR MECHANICAL: 20 B/MIN
SALICYLATE, SERUM: <0.3 MG/DL (ref 0–30)
SODIUM BLD-SCNC: 141 MMOL/L (ref 132–146)
SOURCE, BLOOD GAS: ABNORMAL
SOURCE, BLOOD GAS: ABNORMAL
SPECIFIC GRAVITY UA: 1.02 (ref 1–1.03)
THB: 14.2 G/DL (ref 11.5–16.5)
THB: 14.6 G/DL (ref 11.5–16.5)
TIME ANALYZED: 1903
TIME ANALYZED: 2155
TOTAL CK: 140 U/L (ref 20–180)
TOTAL PROTEIN: 5.9 G/DL (ref 6.4–8.3)
TRICYCLIC ANTIDEPRESSANTS SCREEN SERUM: NEGATIVE NG/ML
TROPONIN, HIGH SENSITIVITY: 15 NG/L (ref 0–9)
TROPONIN, HIGH SENSITIVITY: 42 NG/L (ref 0–9)
TSH SERPL DL<=0.05 MIU/L-ACNC: 0.99 UIU/ML (ref 0.27–4.2)
UROBILINOGEN, URINE: 0.2 E.U./DL
VITAMIN B-12: 361 PG/ML (ref 211–946)
VT MECHANICAL: 350 ML
VT MECHANICAL: 400 ML
WBC # BLD: 12.5 E9/L (ref 4.5–11.5)

## 2023-01-29 PROCEDURE — 80179 DRUG ASSAY SALICYLATE: CPT

## 2023-01-29 PROCEDURE — 82607 VITAMIN B-12: CPT

## 2023-01-29 PROCEDURE — 82010 KETONE BODYS QUAN: CPT

## 2023-01-29 PROCEDURE — 99285 EMERGENCY DEPT VISIT HI MDM: CPT

## 2023-01-29 PROCEDURE — 36592 COLLECT BLOOD FROM PICC: CPT

## 2023-01-29 PROCEDURE — 80143 DRUG ASSAY ACETAMINOPHEN: CPT

## 2023-01-29 PROCEDURE — 36556 INSERT NON-TUNNEL CV CATH: CPT

## 2023-01-29 PROCEDURE — 83930 ASSAY OF BLOOD OSMOLALITY: CPT

## 2023-01-29 PROCEDURE — 84443 ASSAY THYROID STIM HORMONE: CPT

## 2023-01-29 PROCEDURE — 36415 COLL VENOUS BLD VENIPUNCTURE: CPT

## 2023-01-29 PROCEDURE — 81003 URINALYSIS AUTO W/O SCOPE: CPT

## 2023-01-29 PROCEDURE — 83880 ASSAY OF NATRIURETIC PEPTIDE: CPT

## 2023-01-29 PROCEDURE — 6360000002 HC RX W HCPCS: Performed by: EMERGENCY MEDICINE

## 2023-01-29 PROCEDURE — 2580000003 HC RX 258

## 2023-01-29 PROCEDURE — 82805 BLOOD GASES W/O2 SATURATION: CPT

## 2023-01-29 PROCEDURE — 2000000000 HC ICU R&B

## 2023-01-29 PROCEDURE — 80053 COMPREHEN METABOLIC PANEL: CPT

## 2023-01-29 PROCEDURE — 2580000003 HC RX 258: Performed by: EMERGENCY MEDICINE

## 2023-01-29 PROCEDURE — 2580000003 HC RX 258: Performed by: FAMILY MEDICINE

## 2023-01-29 PROCEDURE — 06HY33Z INSERTION OF INFUSION DEVICE INTO LOWER VEIN, PERCUTANEOUS APPROACH: ICD-10-PCS | Performed by: EMERGENCY MEDICINE

## 2023-01-29 PROCEDURE — 83605 ASSAY OF LACTIC ACID: CPT

## 2023-01-29 PROCEDURE — 31500 INSERT EMERGENCY AIRWAY: CPT

## 2023-01-29 PROCEDURE — 84484 ASSAY OF TROPONIN QUANT: CPT

## 2023-01-29 PROCEDURE — 71045 X-RAY EXAM CHEST 1 VIEW: CPT

## 2023-01-29 PROCEDURE — 2500000003 HC RX 250 WO HCPCS: Performed by: EMERGENCY MEDICINE

## 2023-01-29 PROCEDURE — 5A1935Z RESPIRATORY VENTILATION, LESS THAN 24 CONSECUTIVE HOURS: ICD-10-PCS | Performed by: FAMILY MEDICINE

## 2023-01-29 PROCEDURE — 94002 VENT MGMT INPAT INIT DAY: CPT

## 2023-01-29 PROCEDURE — 6360000002 HC RX W HCPCS

## 2023-01-29 PROCEDURE — 80307 DRUG TEST PRSMV CHEM ANLYZR: CPT

## 2023-01-29 PROCEDURE — 51702 INSERT TEMP BLADDER CATH: CPT

## 2023-01-29 PROCEDURE — 93005 ELECTROCARDIOGRAM TRACING: CPT | Performed by: EMERGENCY MEDICINE

## 2023-01-29 PROCEDURE — 82550 ASSAY OF CK (CPK): CPT

## 2023-01-29 PROCEDURE — 87040 BLOOD CULTURE FOR BACTERIA: CPT

## 2023-01-29 PROCEDURE — 82077 ASSAY SPEC XCP UR&BREATH IA: CPT

## 2023-01-29 PROCEDURE — 85025 COMPLETE CBC W/AUTO DIFF WBC: CPT

## 2023-01-29 PROCEDURE — 74018 RADEX ABDOMEN 1 VIEW: CPT

## 2023-01-29 PROCEDURE — 0BH17EZ INSERTION OF ENDOTRACHEAL AIRWAY INTO TRACHEA, VIA NATURAL OR ARTIFICIAL OPENING: ICD-10-PCS | Performed by: FAMILY MEDICINE

## 2023-01-29 PROCEDURE — 6360000002 HC RX W HCPCS: Performed by: STUDENT IN AN ORGANIZED HEALTH CARE EDUCATION/TRAINING PROGRAM

## 2023-01-29 PROCEDURE — 70450 CT HEAD/BRAIN W/O DYE: CPT

## 2023-01-29 PROCEDURE — 2580000003 HC RX 258: Performed by: INTERNAL MEDICINE

## 2023-01-29 PROCEDURE — 83735 ASSAY OF MAGNESIUM: CPT

## 2023-01-29 PROCEDURE — 96374 THER/PROPH/DIAG INJ IV PUSH: CPT

## 2023-01-29 PROCEDURE — 81025 URINE PREGNANCY TEST: CPT

## 2023-01-29 PROCEDURE — 82746 ASSAY OF FOLIC ACID SERUM: CPT

## 2023-01-29 RX ORDER — ENOXAPARIN SODIUM 100 MG/ML
40 INJECTION SUBCUTANEOUS DAILY
Status: DISCONTINUED | OUTPATIENT
Start: 2023-01-30 | End: 2023-02-01 | Stop reason: HOSPADM

## 2023-01-29 RX ORDER — 0.9 % SODIUM CHLORIDE 0.9 %
500 INTRAVENOUS SOLUTION INTRAVENOUS ONCE
Status: COMPLETED | OUTPATIENT
Start: 2023-01-29 | End: 2023-01-29

## 2023-01-29 RX ORDER — FENTANYL CITRATE 50 UG/ML
100 INJECTION, SOLUTION INTRAMUSCULAR; INTRAVENOUS
Status: DISCONTINUED | OUTPATIENT
Start: 2023-01-29 | End: 2023-01-31

## 2023-01-29 RX ORDER — PROPOFOL 10 MG/ML
5-50 INJECTION, EMULSION INTRAVENOUS CONTINUOUS
Status: DISCONTINUED | OUTPATIENT
Start: 2023-01-29 | End: 2023-01-30

## 2023-01-29 RX ORDER — SODIUM CHLORIDE 0.9 % (FLUSH) 0.9 %
10 SYRINGE (ML) INJECTION EVERY 12 HOURS SCHEDULED
Status: DISCONTINUED | OUTPATIENT
Start: 2023-01-29 | End: 2023-02-01 | Stop reason: HOSPADM

## 2023-01-29 RX ORDER — 0.9 % SODIUM CHLORIDE 0.9 %
1000 INTRAVENOUS SOLUTION INTRAVENOUS ONCE
Status: COMPLETED | OUTPATIENT
Start: 2023-01-29 | End: 2023-01-29

## 2023-01-29 RX ORDER — ROCURONIUM BROMIDE 10 MG/ML
INJECTION, SOLUTION INTRAVENOUS DAILY PRN
Status: COMPLETED | OUTPATIENT
Start: 2023-01-29 | End: 2023-01-29

## 2023-01-29 RX ORDER — SODIUM CHLORIDE 0.9 % (FLUSH) 0.9 %
10 SYRINGE (ML) INJECTION PRN
Status: DISCONTINUED | OUTPATIENT
Start: 2023-01-29 | End: 2023-02-01 | Stop reason: HOSPADM

## 2023-01-29 RX ORDER — ONDANSETRON 2 MG/ML
4 INJECTION INTRAMUSCULAR; INTRAVENOUS EVERY 6 HOURS PRN
Status: DISCONTINUED | OUTPATIENT
Start: 2023-01-29 | End: 2023-01-29

## 2023-01-29 RX ORDER — NOREPINEPHRINE BIT/0.9 % NACL 16MG/250ML
1-100 INFUSION BOTTLE (ML) INTRAVENOUS CONTINUOUS
Status: DISCONTINUED | OUTPATIENT
Start: 2023-01-29 | End: 2023-01-30

## 2023-01-29 RX ORDER — MAGNESIUM SULFATE IN WATER 40 MG/ML
2000 INJECTION, SOLUTION INTRAVENOUS ONCE
Status: COMPLETED | OUTPATIENT
Start: 2023-01-29 | End: 2023-01-29

## 2023-01-29 RX ORDER — KETAMINE HYDROCHLORIDE 10 MG/ML
100 INJECTION INTRAMUSCULAR; INTRAVENOUS ONCE
Status: DISCONTINUED | OUTPATIENT
Start: 2023-01-29 | End: 2023-01-31

## 2023-01-29 RX ORDER — ACETAMINOPHEN 325 MG/1
650 TABLET ORAL EVERY 6 HOURS PRN
Status: DISCONTINUED | OUTPATIENT
Start: 2023-01-29 | End: 2023-02-01 | Stop reason: HOSPADM

## 2023-01-29 RX ORDER — SODIUM CHLORIDE 9 MG/ML
INJECTION, SOLUTION INTRAVENOUS CONTINUOUS
Status: DISCONTINUED | OUTPATIENT
Start: 2023-01-29 | End: 2023-01-29

## 2023-01-29 RX ORDER — KETAMINE HYDROCHLORIDE 10 MG/ML
INJECTION INTRAMUSCULAR; INTRAVENOUS DAILY PRN
Status: COMPLETED | OUTPATIENT
Start: 2023-01-29 | End: 2023-01-29

## 2023-01-29 RX ORDER — NALOXONE HYDROCHLORIDE 1 MG/ML
2 INJECTION INTRAMUSCULAR; INTRAVENOUS; SUBCUTANEOUS ONCE
Status: DISCONTINUED | OUTPATIENT
Start: 2023-01-29 | End: 2023-01-31

## 2023-01-29 RX ORDER — PROMETHAZINE HYDROCHLORIDE 12.5 MG/1
12.5 TABLET ORAL EVERY 6 HOURS PRN
Status: DISCONTINUED | OUTPATIENT
Start: 2023-01-29 | End: 2023-02-01 | Stop reason: HOSPADM

## 2023-01-29 RX ORDER — NALOXONE HYDROCHLORIDE 1 MG/ML
2 INJECTION INTRAMUSCULAR; INTRAVENOUS; SUBCUTANEOUS ONCE
Status: COMPLETED | OUTPATIENT
Start: 2023-01-29 | End: 2023-01-29

## 2023-01-29 RX ORDER — POLYETHYLENE GLYCOL 3350 17 G/17G
17 POWDER, FOR SOLUTION ORAL DAILY PRN
Status: DISCONTINUED | OUTPATIENT
Start: 2023-01-29 | End: 2023-02-01 | Stop reason: HOSPADM

## 2023-01-29 RX ORDER — THIAMINE HYDROCHLORIDE 100 MG/ML
100 INJECTION, SOLUTION INTRAMUSCULAR; INTRAVENOUS DAILY
Status: DISCONTINUED | OUTPATIENT
Start: 2023-01-30 | End: 2023-02-01 | Stop reason: HOSPADM

## 2023-01-29 RX ORDER — ACETAMINOPHEN 650 MG/1
650 SUPPOSITORY RECTAL EVERY 6 HOURS PRN
Status: DISCONTINUED | OUTPATIENT
Start: 2023-01-29 | End: 2023-02-01 | Stop reason: HOSPADM

## 2023-01-29 RX ORDER — MIDAZOLAM HYDROCHLORIDE 2 MG/2ML
4 INJECTION, SOLUTION INTRAMUSCULAR; INTRAVENOUS
Status: DISCONTINUED | OUTPATIENT
Start: 2023-01-29 | End: 2023-01-30

## 2023-01-29 RX ORDER — ROCURONIUM BROMIDE 10 MG/ML
100 INJECTION, SOLUTION INTRAVENOUS ONCE
Status: DISCONTINUED | OUTPATIENT
Start: 2023-01-29 | End: 2023-01-31

## 2023-01-29 RX ORDER — PROPOFOL 10 MG/ML
INJECTION, EMULSION INTRAVENOUS
Status: COMPLETED
Start: 2023-01-29 | End: 2023-01-29

## 2023-01-29 RX ORDER — SODIUM CHLORIDE 9 MG/ML
INJECTION, SOLUTION INTRAVENOUS PRN
Status: DISCONTINUED | OUTPATIENT
Start: 2023-01-29 | End: 2023-02-01 | Stop reason: HOSPADM

## 2023-01-29 RX ADMIN — KETAMINE HYDROCHLORIDE 100 MG: 10 INJECTION INTRAMUSCULAR; INTRAVENOUS at 18:04

## 2023-01-29 RX ADMIN — SODIUM CHLORIDE, PRESERVATIVE FREE 10 ML: 5 INJECTION INTRAVENOUS at 22:39

## 2023-01-29 RX ADMIN — SODIUM CHLORIDE 250 ML: 9 INJECTION, SOLUTION INTRAVENOUS at 21:08

## 2023-01-29 RX ADMIN — SODIUM CHLORIDE 1000 ML: 9 INJECTION, SOLUTION INTRAVENOUS at 18:33

## 2023-01-29 RX ADMIN — SODIUM CHLORIDE 1000 ML: 9 INJECTION, SOLUTION INTRAVENOUS at 19:11

## 2023-01-29 RX ADMIN — SODIUM CHLORIDE 500 ML: 9 INJECTION, SOLUTION INTRAVENOUS at 22:39

## 2023-01-29 RX ADMIN — MAGNESIUM SULFATE HEPTAHYDRATE 2000 MG: 40 INJECTION, SOLUTION INTRAVENOUS at 19:03

## 2023-01-29 RX ADMIN — NALOXONE HYDROCHLORIDE 2 MG: 1 INJECTION PARENTERAL at 18:15

## 2023-01-29 RX ADMIN — AMPICILLIN SODIUM AND SULBACTAM SODIUM 3000 MG: 2; 1 INJECTION, POWDER, FOR SOLUTION INTRAMUSCULAR; INTRAVENOUS at 20:18

## 2023-01-29 RX ADMIN — PROPOFOL 5 MCG/KG/MIN: 10 INJECTION, EMULSION INTRAVENOUS at 18:19

## 2023-01-29 RX ADMIN — ROCURONIUM BROMIDE 100 MG: 10 INJECTION, SOLUTION INTRAVENOUS at 18:05

## 2023-01-29 ASSESSMENT — PULMONARY FUNCTION TESTS
PIF_VALUE: 21
PIF_VALUE: 21
PIF_VALUE: 20
PIF_VALUE: 21
PIF_VALUE: 20

## 2023-01-29 NOTE — ED NOTES
Patient friend, Hanh Dumont, dropping of PT identification. Drivers license given to ED registration. Kai Cordero of patient states she received call that patient was at another friends house and was overdosing on what she suspects was heroin. Friend picked patient up from house and brought immediately to ED via private vehicle. Other than above information she states she does not know anything else. Friend of patient unable to stay, leaving number for patient to call once stable. Hanh Dumont [phone].      Leroy Marquez, [place]  61/28/38 [id number]

## 2023-01-29 NOTE — ED PROVIDER NOTES
HPI   80-year-old female patient presented to emergency department for possible overdose. Patient was pulled out of the car by nursing staff. Patient on arrival with pinpoint pupils, sonorous respirations, GCS of 3 initially. Nonrebreather promptly placed on patient, IV access obtained. 4 mg of IV Narcan given, patient more responsive, GCS of 9 but not appropriately following commands, concern for possible airway compromise decision made to intubate. History limited due to patient's acuity and condition  Review of Systems   Unable to perform ROS: Acuity of condition      Physical Exam  Vitals and nursing note reviewed. Constitutional:       General: She is in acute distress. HENT:      Head: Normocephalic and atraumatic. Nose: Nose normal. No congestion. Mouth/Throat:      Mouth: Mucous membranes are moist.      Pharynx: Oropharynx is clear. Eyes:      Comments: Pupils are pinpoint on arrival   Cardiovascular:      Rate and Rhythm: Normal rate and regular rhythm. Heart sounds: Normal heart sounds. Pulmonary:      Effort: Pulmonary effort is normal.      Breath sounds: Normal breath sounds. Abdominal:      General: There is no distension. Tenderness: There is no abdominal tenderness. Musculoskeletal:      Cervical back: Neck supple. Right lower leg: No edema. Left lower leg: No edema. Skin:     General: Skin is dry. Capillary Refill: Capillary refill takes less than 2 seconds. Comments: Cool   Neurological:      GCS: GCS eye subscore is 1. GCS verbal subscore is 1. GCS motor subscore is 1. Procedures     MDM:     Patient arrived to emergency department for possible overdose.   Patient initially here with GCS of 3 she was given 4 mg of Narcan, GCS improved to 9 however patient is still not protecting her airway, she was agitated, decision made to intubate due to high concern for possible opiate overdose, anoxic brain injury, intracranial hemorrhage, electrolyte disturbance. After patient was intubated lab work was all drawn. No acute electrolyte abnormalities. Potassium stable at 3.5. She was found to have acute lactic acidosis, lactic acid of 5.7. Her troponin was elevated at 15 but EKG did not show any acute STEMI. EKG was with a prolonged QTC, thus grams of magnesium was given here. Her urine drug screen came back positive for cannabinoids, cocaine, fentanyl as interpreted by me. Blood and urine cultures were drawn as well done here. Patient was also severely hypothermic, warming blanket was placed. CT head without contrast showing no acute intracranial hemorrhage as interpreted by me. X-ray of her chest performed here showing endotracheal tube in appropriate position as interpreted by me. At this time patient to be admitted for further critical care management. We still have a concern for anoxic brain injury as it is unknown how long patient was having sonorous respirations and hypoxic prior to arrival, patient put 2 L out through Hoffman catheter on insertion, the patient is critically ill. Consideration given to possible aspiration pneumonia, patient was covered here with IV Unasyn. Venous Access Procedure Note, Procedure performed by Dr. Lisette Willard  Indication: emergent need for intravenous access    Procedure: The patient was placed in the appropriate position and the skin over the puncture site was prepped with alcohol. Intravenous access was obtained in the left external jugular vein and the site was secured appropriately. The patient tolerated the procedure well. Complications: None   --------------------------------------------------------------------------------------------------  Intubation Procedure Note    Indication: Respiratory failure, comatose state, hypercarbia, and airway protection    Consent: Unable to be obtained due to the emergent nature of this procedure.     Medications Used: ketamine intravenously and rocuronium intravenously    Procedure: The patient was placed in the appropriate position. Cricoid pressure was not required. Intubation was performed by direct laryngoscopy using a laryngoscope and a 7.5 cuffed endotracheal tube. The cuff was then inflated and the tube was secured appropriately at a distance of 23 cm to the dental ridge. Initial confirmation of placement included bilateral breath sounds, an end tidal CO2 detector, absence of sounds over the stomach, tube fogging, and adequate chest rise. A chest x-ray to verify correct placement of the tube showed appropriate tube position. The patient tolerated the procedure well.      Complications: None    Medications   ketamine (KETALAR) injection 100 mg ( IntraVENous Canceled Entry 1/29/23 1925)   rocuronium [facility]) injection 100 mg ( IntraVENous Canceled Entry 1/29/23 1834)   fentaNYL (SUBLIMAZE) injection 100 mcg (has no administration in time range)   midazolam PF (VERSED) injection 4 mg (has no administration in time range)   0.9 % sodium chloride infusion (has no administration in time range)   norepinephrine (LEVOPHED) 16 mg in sodium chloride 0.9 % 250 mL infusion (0 mcg/min IntraVENous Held 1/29/23 1926)   propofol injection (5 mcg/kg/min × 54.4 kg IntraVENous New Bag 1/29/23 1819)   naloxone [facility]) injection 2 mg (has no administration in time range)   0.9 % sodium chloride bolus (has no administration in time range)   ketamine (KETALAR) injection (100 mg IntraVENous Given 1/29/23 1804)   rocuronium (ZEMURON) injection (100 mg IntraVENous Given 1/29/23 1805)   naloxone (NARCAN) injection 2 mg (2 mg IntraVENous Given 1/29/23 1815)   0.9 % sodium chloride bolus (0 mLs IntraVENous Stopped 1/29/23 1903)   magnesium sulfate 2000 mg in 50 mL IVPB premix (2,000 mg IntraVENous New Bag 1/29/23 1903)   0.9 % sodium chloride bolus (1,000 mLs IntraVENous New Bag 1/29/23 1911)   ampicillin-sulbactam (UNASYN) 3,000 mg in sodium chloride 0.9 % 100 mL IVPB ([id number]) (3,000 mg IntraVENous New Bag 1/29/23 2018)              ED Course as of 01/29/23 2057   Harvey Thomson Jan 29, 2023   1810 On chart review in May 2020 patient was seen here for opiate overdose, DKA, admitted to the hospital by Jacqueline Aguilar [FG]   1859 EKG showing normal sinus rhythm 90 bpm, no acute ST elevations or depressions. QTC prolonged 546. No prior to compare to. Interpretation performed by ED physician. [FG]   21  with Dr. Mile Quick intensivist, patient accepted for admission. [FG]   1911 Spoke with Dr. Gonzales Been will admit [FG]   1939 PROCEDURE  1/29/23       Time: [address]  Risks, benefits and alternatives (for applicable procedures below) described. Performed By: EM Attending Physician and EM Resident. Indication: centrally administered medications. Informed consent: Consent unable to be obtained due to patient's condition. .  Procedure: After routine sterile preparation, local anesthesia obtained by infiltration using 1% Lidocaine without epinephrine. A right 3-Lumen 7F Central Venous Catheter was placed by femoral vein approach and secured by standard fashion. Ultrasound Guidance:   not used. Number of Attempts: 1  Post-procedure Findings: A post procedural chest x-ray  was not indicated. Patient tolerated the procedure well. [ME]      ED Course User Index  [FG] Ridge Miller DO  [ME] Jr Godwin DO       --------------------------------------------- PAST HISTORY ---------------------------------------------  Past Medical History:  has no past medical history on file. Past Surgical History:  has no past surgical history on file. Social History:      Family History: family history is not on file. The patients home medications have been reviewed.     Allergies: Patient has no allergy information on record.    -------------------------------------------------- RESULTS -------------------------------------------------    Lab  Results for orders placed or performed during the hospital encounter of 01/29/23   Lactate, Sepsis   Result Value Ref Range    Lactic Acid, Sepsis 5.7 (HH) 0.5 - 1.9 mmol/L   Troponin   Result Value Ref Range    Troponin, High Sensitivity 15 (H) 0 - 9 ng/L   CBC with Auto Differential   Result Value Ref Range    WBC 12.5 (H) 4.5 - 11.5 E9/L    RBC 4.54 3.50 - 5.50 E12/L    Hemoglobin 13.9 11.5 - 15.5 g/dL    Hematocrit 43.6 34.0 - 48.0 %    MCV 96.0 80.0 - 99.9 fL    MCH 30.6 26.0 - 35.0 pg    MCHC 31.9 (L) 32.0 - 34.5 %    RDW 13.0 11.5 - 15.0 fL    Platelets 159 491 - 686 E9/L    MPV 9.2 7.0 - 12.0 fL    Neutrophils % 50.5 43.0 - 80.0 %    Immature Granulocytes % 0.8 0.0 - 5.0 %    Lymphocytes % 42.1 (H) 20.0 - 42.0 %    Monocytes % 3.0 2.0 - 12.0 %    Eosinophils % 2.9 0.0 - 6.0 %    Basophils % 0.7 0.0 - 2.0 %    Neutrophils Absolute 6.34 1.80 - 7.30 E9/L    Immature Granulocytes # 0.10 E9/L    Lymphocytes Absolute 5.28 (H) 1.50 - 4.00 E9/L    Monocytes Absolute 0.37 0.10 - 0.95 E9/L    Eosinophils Absolute 0.36 0.05 - 0.50 E9/L    Basophils Absolute 0.09 0.00 - 0.20 E9/L   Comprehensive Metabolic Panel   Result Value Ref Range    Sodium 141 132 - 146 mmol/L    Potassium 3.5 3.5 - 5.0 mmol/L    Chloride 101 98 - 107 mmol/L    CO2 23 22 - 29 mmol/L    Anion Gap 17 (H) 7 - 16 mmol/L    Glucose 343 (H) 74 - 99 mg/dL    BUN 10 6 - 20 mg/dL    Creatinine 1.0 0.5 - 1.0 mg/dL    Est, Glom Filt Rate >60 >=60 mL/min/1.73    Calcium 8.6 8.6 - 10.2 mg/dL    Total Protein 5.9 (L) 6.4 - 8.3 g/dL    Albumin 4.0 3.5 - 5.2 g/dL    Total Bilirubin 0.5 0.0 - 1.2 mg/dL    Alkaline Phosphatase 76 35 - 104 U/L    ALT 15 0 - 32 U/L    AST 26 0 - 31 U/L   Brain Natriuretic Peptide   Result Value Ref Range    Pro- (H) 0 - 125 pg/mL   Magnesium   Result Value Ref Range    Magnesium 2.5 1.6 - 2.6 mg/dL   Urinalysis   Result Value Ref Range    Color, UA Yellow Straw/Yellow Clarity, UA Clear Clear    Glucose, Ur 500 (A) Negative mg/dL    Bilirubin Urine Negative Negative    Ketones, Urine Negative Negative mg/dL    Specific Gravity, UA 1.020 1.005 - 1.030    Blood, Urine Negative Negative    pH, UA 6.0 5.0 - 9.0    Protein, UA Negative Negative mg/dL    Urobilinogen, Urine 0.2 <2.0 E.U./dL    Nitrite, Urine Negative Negative    Leukocyte Esterase, Urine Negative Negative   Urine Drug Screen   Result Value Ref Range    Amphetamine Screen, Urine NOT DETECTED Negative <1000 ng/mL    Barbiturate Screen, Ur NOT DETECTED Negative < 200 ng/mL    Benzodiazepine Screen, Urine NOT DETECTED Negative < 200 ng/mL    Cannabinoid Scrn, Ur POSITIVE (A) Negative < 50ng/mL    Cocaine Metabolite Screen, Urine POSITIVE (A) Negative < 300 ng/mL    Opiate Scrn, Ur NOT DETECTED Negative < 300ng/mL    PCP Screen, Urine NOT DETECTED Negative < 25 ng/mL    Methadone Screen, Urine NOT DETECTED Negative <300 ng/mL    Oxycodone Urine NOT DETECTED Negative <100 ng/mL    FENTANYL SCREEN, URINE POSITIVE (A) Negative <1 ng/mL    Drug Screen Comment: see below    Serum Drug Screen   Result Value Ref Range    Ethanol Lvl <10 mg/dL    Acetaminophen Level <5.0 (L) 10.0 - 88.1 mcg/mL    Salicylate, Serum <1.2 0.0 - 30.0 mg/dL    TCA Scrn NEGATIVE Cutoff:300 ng/mL   CK   Result Value Ref Range    Total  20 - 180 U/L   Blood Gas, Arterial   Result Value Ref Range    Date Analyzed 20230129     Time Analyzed 1903     Source: Blood Arterial     pH, Blood Gas 7.217 (L) 7.350 - 7.450    PCO2 53.1 (H) 35.0 - 45.0 mmHg    PO2 >500.0 75.0 - 100.0 mmHg    HCO3 21.1 (L) 22.0 - 26.0 mmol/L    B.E. -7.1 (L) -3.0 - 3.0 mmol/L    O2 Sat 99.6 (H) 92.0 - 98.5 %    O2Hb 96.0 94.0 - 97.0 %    COHb 3.3 (H) 0.0 - 1.5 %    MetHb 0.3 0.0 - 1.5 %    HHb 0.4 0.0 - 5.0 %    tHb (est) 14.6 11.5 - 16.5 g/dL    Mode AC     FIO2 100.0 %    Rr Mechanical 20.0 b/min    Vt Mechanical 400.0 mL    Peep/Cpap 5.0 cmH2O    Date Of Collection      Time Collected      Pt Temp 37.0 C     ID [id number]     Lab [id number]     Critical(s) Notified . No Critical Values    Pregnancy, urine   Result Value Ref Range    HCG(Urine) Pregnancy Test NEGATIVE NEGATIVE   EKG 12 Lead   Result Value Ref Range    Ventricular Rate 98 BPM    Atrial Rate 98 BPM    P-R Interval 138 ms    QRS Duration 90 ms    Q-T Interval 428 ms    QTc Calculation (Bazett) 546 ms    P Axis 75 degrees    R Axis 81 degrees    T Axis -50 degrees       Radiology  CT HEAD WO CONTRAST   Final Result   No acute intracranial abnormality. XR CHEST PORTABLE   Final Result   ET tube tip 3.5 cm from the toyin. XR ABDOMEN FOR NG/OG/NE TUBE PLACEMENT   Final Result   Enteric tube tip in the proximal to mid gastric body               ------------------------- NURSING NOTES AND VITALS REVIEWED ---------------------------  Date / Time Roomed:  1/29/2023  5:52 PM  ED Bed Assignment:  [id number]    The nursing notes within the ED encounter and vital signs as below have been reviewed.    Patient Vitals for the past 24 hrs:   BP Temp Temp src Pulse Resp SpO2 Height Weight   01/29/23 2053 126/84 -- -- 66 27 100 % -- --   01/29/23 2049 134/79 -- -- 68 22 100 % -- --   01/29/23 2042 (!) 67/44 (!) 93.6 °F (34.2 °C) Bladder 89 19 100 % -- --   01/29/23 2038 -- -- -- 78 20 100 % -- --   01/29/23 2019 136/88 (!) 93.7 °F (34.3 °C) -- 77 20 100 % -- --   01/29/23 2005 127/85 -- -- 70 20 100 % -- --   01/29/23 1915 (!) 147/99 -- -- 88 20 100 % -- --   01/29/23 1910 (!) 135/98 (!) (P) 92.5 °F (33.6 °C) Bladder 92 20 100 % -- --   01/29/23 1900 (!) 146/104 (!) 92.3 °F (33.5 °C) Bladder 98 20 100 % -- --   01/29/23 1837 -- -- -- (!) 105 20 -- -- --   01/29/23 1830 (!) 147/81 -- -- (!) 101 20 98 % -- --   01/29/23 1815 126/82 -- -- 99 20 100 % -- --   01/29/23 1814 -- -- -- -- -- -- 5' 4\" (1.626 m) 120 lb (54.4 kg)   01/29/23 1808 -- -- -- (!) 103 22 100 % -- --   01/29/23 1806 (!) 121/94 -- -- (!) 106 14 97 % -- -- 01/29/23 1757 -- -- -- (!) 109 -- -- -- --   01/29/23 1756 -- -- -- (!) 105 -- 100 % -- --   01/29/23 1755 123/66 -- -- 94 20 100 % -- --   01/29/23 1754 -- -- -- 93 -- 100 % -- --   01/29/23 1753 -- -- -- 96 -- 100 % -- --   01/29/23 1752 -- -- -- 93 -- 100 % -- --     --------------------------------- ADDITIONAL PROVIDER NOTES ---------------------------------  This patient's ED course included: a personal history and physicial examination, re-evaluation prior to disposition, multiple bedside re-evaluations, IV medications, cardiac monitoring, continuous pulse oximetry, and complex medical decision making and emergency management    This patient has remained hemodynamically stable and improved during their ED course. Please note that the withdrawal or failure to initiate urgent interventions for this patient would likely result in a life threatening deterioration or permanent disability. Accordingly this patient received 32 minutes of critical care time, excluding separately billable procedures. Clinical Impression  1. Pho[place]be coma scale total score 3-8, at arrival to emergency department [facility])    2. QT prolongation    3. Opiate overdose, undetermined intent, initial encounter ([facility] [address]    4. Hypothermia, initial encounter    5. Aspiration into airway, initial encounter    6. Elevated lactic acid level    7. Acidosis    8. Hyperglycemia    9. Encephalopathy acute    10.  Polysubstance abuse ([facility] [address]          Disposition  Patient's disposition: Admit to CCU/ICU  Patient's condition is critical.       José Miguel Hargrove DO  Resident  01/29/23 2048        ATTENDING PROVIDER ATTESTATION:     Maged Shiramarisela presented to the emergency department for evaluation of Drug Overdose (Pulled from car unresponsive, blue, agonal, \"took heroine\")    I have reviewed and discussed the case, including pertinent history (medical, surgical, family and social) and exam findings with the Resident and the Nurse assigned to [address] [place] [address] Juliann. I have personally performed and/or participated in the history, exam, medical decision making, and procedures and agree with all pertinent clinical information. I have reviewed my findings and recommendations with Lorena Thomas and members of family present at the time of disposition. I have personally reviewed all laboratory radiology results from today's visit. I have personally reviewed and agree with resident interpretation of EKG for all EKGs from today's visit. MDM:       SEP-1 CORE MEASURE DATA      Sepsis Criteria   Severe Sepsis Criteria   Septic Shock Criteria     Must be confirmed or suspected to move forward with diagnosis of sepsis. Must meet 2:    [x] Temperature > 100.9 F (38.3 C)        or < 96.8 F (36 C)  [] HR > 90  [] RR > 20  [x] WBC > 12 or < 4 or 10% bands      AND:      [x] Infection Confirmed or        Suspected. Must meet 1:    [x] Lactate > 2       or   [] Signs of Organ Dysfunction:    - SBP < 90 or MAP < 65  - Altered mental status  - Creatinine > 2 or increased from      baseline  - Urine Output < 0.5 ml/kg/hr  - Bilirubin > 2  - INR > 1.5 (not anticoagulated)  - Platelets < 584,472  - Acute Respiratory Failure as     evidenced by new need for NIPPV     or mechanical ventilation      [] No criteria met for Severe Sepsis. Must meet 1:    [x] Lactate > 4        or   [] SBP < 90 or MAP < 65 for at        least two readings in the first        hour after fluid bolus        administration      [] Vasopressors initiated (if hypotension persists after fluid resuscitation)        [] No criteria met for Septic Shock.    Patient Vitals for the past 6 hrs:   BP Temp Pulse Resp SpO2 Height Weight Percent Weight Change   01/29/23 1752 -- -- 93 -- 100 % -- -- --   01/29/23 1753 -- -- 96 -- 100 % -- -- --   01/29/23 1754 -- -- 93 -- 100 % -- -- --   01/29/23 1755 123/66 -- 94 20 100 % -- -- --   01/29/23 1756 -- -- (!) 105 -- 100 % -- -- --   01/29/23 1757 -- -- (!) 109 -- -- -- -- --   01/29/23 1806 (!) 121/94 -- (!) 106 14 97 % -- -- --   01/29/23 1808 -- -- (!) 103 22 100 % -- -- --   01/29/23 1814 -- -- -- -- -- 5' 4\" (1.626 m) 120 lb (54.4 kg) 0   01/29/23 1815 126/82 -- 99 20 100 % -- -- --   01/29/23 1830 (!) 147/81 -- (!) 101 20 98 % -- -- --   01/29/23 1837 -- -- (!) 105 20 -- -- -- --   01/29/23 1900 (!) 146/104 (!) 92.3 °F (33.5 °C) 98 20 100 % -- -- --   01/29/23 1910 (!) 135/98 -- 92 20 100 % -- -- --   01/29/23 1915 (!) 147/99 -- 88 20 100 % -- -- --   01/29/23 2005 127/85 -- 70 20 100 % -- -- --   01/29/23 2019 136/88 (!) 93.7 °F (34.3 °C) 77 20 100 % -- -- --   01/29/23 2038 -- -- 78 20 100 % -- -- --   01/29/23 2042 (!) 67/44 (!) 93.6 °F (34.2 °C) 89 19 100 % -- -- --   01/29/23 2049 134/79 -- 68 22 100 % -- -- --   01/29/23 2053 126/84 -- 66 27 100 % -- -- --      Recent Labs     01/29/23  1820   WBC 12.5*   CREATININE 1.0   BILITOT 0.5            Time Septic Shock Identified: 1914    Fluid Resuscitation Rational: at least 30mL/kg based on entered actual weight at time of triage    Repeat lactate level: ordered and pending at this time    Reassessment Exam:   I have reassessed tissue perfusion and hemodynamic status after fluid bolus at this time:        I, Dr. Rik Bella am the primary provider of record    Medical Decision Making  Patient arrives at the front door altered in severe distress. Reportedly a drug overdose, it is unclear how long she is been unresponsive. Differential diagnosis includes hypoglycemia, opiate overdose, anoxic injury, sepsis, to name a few she was brought back to resuscitation room, point-of-care glucose greater than 300, she had pinpoint pupils, emergent IV access was established, she was given 4 mg of IV Narcan with improvement of her mental status, however not directable not speaking coherently, high concern for anoxic injury, she was intubated for airway protection.   Copious secretions were suctioned from her airway, spoke with medicine critical care patient will be admitted      EKG is ordered to have documentation of patient's current rhythm, and to rule out any obvious acute cardiac illnesses such as ACS. Additionally, QT interval may be of use in decision making regarding any medications administered here in the ED. Patient is placed on cardiac monitor and continuous pulse ox for monitoring. POCT glucose ordered to rule out acute hyperglycemia or hypoglycemia as a cause of symptoms. POCT pregnancy test ordered to evaluate immediately pregnancy status of patient. CBC is ordered to evaluate for any signs of infection or inflammation by obtaining a WBC count, or any signs of acute anemia by interpreting hemoglobin. CMP was ordered to evaluate for any electrolyte imbalances, kidney function, or any elevations in anion gap. Troponin ordered to evaluate for possible cardiac etiology of symptoms including but not limited to STEMI or NSTEMI. Urinalysis ordered to evaluate for UTI as the possible cause of symptoms, and may also evaluate hematuria as well. Lactic acid levels were ordered to evaluate for signs of ischemia or decrease perfusion to organ systems. Blood cultures are ordered to evaluate, rule out and, if present, treat bacteremia with antibiotics narrowed down to the found organism. CK levels ordered to rule out rhabdomyolysis as cause of symptoms. CT head without contrast was ordered to evaluate for acute or chronic intracranial hemorrhage or masses. Problems Addressed:  Encephalopathy acute: acute illness or injury that poses a threat to life or bodily functions  Phoebe coma scale total score 3-8, at arrival to emergency department [facility]): acute illness or injury that poses a threat to life or bodily functions  QT prolongation: acute illness or injury that poses a threat to life or bodily functions    Amount and/or Complexity of Data Reviewed  Labs: ordered.  Decision-making details documented in ED Course. Radiology: ordered and independent interpretation performed. Decision-making details documented in ED Course. ECG/medicine tests: ordered and independent interpretation performed. Decision-making details documented in ED Course. Risk  Prescription drug management. Decision regarding hospitalization. Critical Care  Total time providing critical care: 30-74 minutes (Please note that the withdrawal or failure to initiate urgent interventions for this patient would likely result in a life threatening deterioration or permanent disability. Accordingly this patient received 32 minutes of critical care time, excluding separately billable procedures.  )    This X-Ray is independently viewed and interpreted by me:   - Study: Chest X-Ray   - Number of Views: 1  - Findings: ETT in place, no PTX     My findings/plan: The primary encounter diagnosis was [place] coma scale total score 3-8, at arrival to emergency department [facility]). Diagnoses of QT prolongation, Opiate overdose, undetermined intent, initial encounter ([facility] [address] Hypothermia, initial encounter, Aspiration into airway, initial encounter, Elevated lactic acid level, Acidosis, Hyperglycemia, Encephalopathy acute, and Polysubstance abuse ([address] were also pertinent to this visit. There are no discharge medications for this patient.     DO Ruma Boo DO  01/29/23 5511

## 2023-01-29 NOTE — PROGRESS NOTES
Radiology Procedure Waiver   Name: Kaylin Figueroa  : 1980  MRN: [id number]    Date:  23    Time: 6:36 PM EST    Benefits of immediately proceeding with radiology exam(s) without pre-testing outweigh the risks or are not indicated as specified below and therefore the following is/are being waived:    [x] Benefits of immediate radiology exam(s) outweigh any risk. OR    Pre-exam testing is not indicated for the following reason(s):  [] Pregnancy test   [] Patients LMP on-time and regular.   [] Patient had Tubal Ligation or has other Contraception Device. [] Patient  is Menopausal or Premenarcheal.    [] Patient had Full or Partial Hysterectomy. [] Protocol for CT contrast allegry   [] Patient has tolerated well previously   [] Patient does not have a true allergy    [] MRI Questionnaire     [] BUN/Creatinine   [] Patient age w/no hx of renal dysfunction. [] Patient on Dialysis. [] Recent Normal Labs.   Electronically signed by Karen Garcia DO on 23 at 6:36 PM EST

## 2023-01-30 PROBLEM — R40.2430 GLASGOW COMA SCALE TOTAL SCORE 3-8 (HCC): Status: ACTIVE | Noted: 2023-01-30

## 2023-01-30 LAB
AADO2: 143.7 MMHG
ALBUMIN SERPL-MCNC: 3.1 G/DL (ref 3.5–5.2)
ALP BLD-CCNC: 54 U/L (ref 35–104)
ALT SERPL-CCNC: 19 U/L (ref 0–32)
ANION GAP SERPL CALCULATED.3IONS-SCNC: 12 MMOL/L (ref 7–16)
ANION GAP SERPL CALCULATED.3IONS-SCNC: 13 MMOL/L (ref 7–16)
AST SERPL-CCNC: 26 U/L (ref 0–31)
B.E.: -0.5 MMOL/L (ref -3–3)
BASOPHILS ABSOLUTE: 0.03 E9/L (ref 0–0.2)
BASOPHILS RELATIVE PERCENT: 0.2 % (ref 0–2)
BILIRUB SERPL-MCNC: 0.5 MG/DL (ref 0–1.2)
BUN BLDV-MCNC: 7 MG/DL (ref 6–20)
BUN BLDV-MCNC: 8 MG/DL (ref 6–20)
CALCIUM IONIZED: 1.17 MMOL/L (ref 1.15–1.33)
CALCIUM SERPL-MCNC: 7.1 MG/DL (ref 8.6–10.2)
CALCIUM SERPL-MCNC: 7.5 MG/DL (ref 8.6–10.2)
CHLORIDE BLD-SCNC: 104 MMOL/L (ref 98–107)
CHLORIDE BLD-SCNC: 108 MMOL/L (ref 98–107)
CO2: 21 MMOL/L (ref 22–29)
CO2: 23 MMOL/L (ref 22–29)
COHB: 1 % (ref 0–1.5)
CREAT SERPL-MCNC: 0.7 MG/DL (ref 0.5–1)
CREAT SERPL-MCNC: 1 MG/DL (ref 0.5–1)
CRITICAL: NORMAL
DATE ANALYZED: NORMAL
DATE OF COLLECTION: NORMAL
EKG ATRIAL RATE: 98 BPM
EKG P AXIS: 75 DEGREES
EKG P-R INTERVAL: 138 MS
EKG Q-T INTERVAL: 428 MS
EKG QRS DURATION: 90 MS
EKG QTC CALCULATION (BAZETT): 546 MS
EKG R AXIS: 81 DEGREES
EKG T AXIS: -50 DEGREES
EKG VENTRICULAR RATE: 98 BPM
EOSINOPHILS ABSOLUTE: 0.07 E9/L (ref 0.05–0.5)
EOSINOPHILS RELATIVE PERCENT: 0.5 % (ref 0–6)
FIO2: 40 %
GFR SERPL CREATININE-BSD FRML MDRD: >60 ML/MIN/1.73
GFR SERPL CREATININE-BSD FRML MDRD: >60 ML/MIN/1.73
GLUCOSE BLD-MCNC: 193 MG/DL (ref 74–99)
GLUCOSE BLD-MCNC: 74 MG/DL (ref 74–99)
HCO3: 23.2 MMOL/L (ref 22–26)
HCT VFR BLD CALC: 34.5 % (ref 34–48)
HEMOGLOBIN: 11.6 G/DL (ref 11.5–15.5)
HHB: 3.2 % (ref 0–5)
IMMATURE GRANULOCYTES #: 0.08 E9/L
IMMATURE GRANULOCYTES %: 0.6 % (ref 0–5)
LAB: NORMAL
LACTIC ACID: 1 MMOL/L (ref 0.5–2.2)
LYMPHOCYTES ABSOLUTE: 2.39 E9/L (ref 1.5–4)
LYMPHOCYTES RELATIVE PERCENT: 17.5 % (ref 20–42)
Lab: NORMAL
MCH RBC QN AUTO: 30.9 PG (ref 26–35)
MCHC RBC AUTO-ENTMCNC: 33.6 % (ref 32–34.5)
MCV RBC AUTO: 92 FL (ref 80–99.9)
METER GLUCOSE: 177 MG/DL (ref 74–99)
METER GLUCOSE: 86 MG/DL (ref 74–99)
METHB: 0.4 % (ref 0–1.5)
MODE: AC
MONOCYTES ABSOLUTE: 0.72 E9/L (ref 0.1–0.95)
MONOCYTES RELATIVE PERCENT: 5.3 % (ref 2–12)
NEUTROPHILS ABSOLUTE: 10.39 E9/L (ref 1.8–7.3)
NEUTROPHILS RELATIVE PERCENT: 75.9 % (ref 43–80)
O2 SATURATION: 96.8 % (ref 92–98.5)
O2HB: 95.4 % (ref 94–97)
OPERATOR ID: 2593
PATIENT TEMP: 37 C
PCO2: 35.5 MMHG (ref 35–45)
PDW BLD-RTO: 13 FL (ref 11.5–15)
PEEP/CPAP: 5 CMH2O
PFO2: 2.27 MMHG/%
PH BLOOD GAS: 7.43 (ref 7.35–7.45)
PLATELET # BLD: 276 E9/L (ref 130–450)
PMV BLD AUTO: 9.3 FL (ref 7–12)
PO2: 90.7 MMHG (ref 75–100)
POTASSIUM SERPL-SCNC: 3.4 MMOL/L (ref 3.5–5)
POTASSIUM SERPL-SCNC: 3.5 MMOL/L (ref 3.5–5)
RBC # BLD: 3.75 E12/L (ref 3.5–5.5)
RI(T): 1.58
RR MECHANICAL: 20 B/MIN
SODIUM BLD-SCNC: 138 MMOL/L (ref 132–146)
SODIUM BLD-SCNC: 143 MMOL/L (ref 132–146)
SOURCE, BLOOD GAS: NORMAL
THB: 12.9 G/DL (ref 11.5–16.5)
TIME ANALYZED: 432
TOTAL PROTEIN: 4.8 G/DL (ref 6.4–8.3)
TROPONIN, HIGH SENSITIVITY: 25 NG/L (ref 0–9)
VT MECHANICAL: 350 ML
WBC # BLD: 13.7 E9/L (ref 4.5–11.5)

## 2023-01-30 PROCEDURE — 82805 BLOOD GASES W/O2 SATURATION: CPT

## 2023-01-30 PROCEDURE — 2580000003 HC RX 258

## 2023-01-30 PROCEDURE — 80048 BASIC METABOLIC PNL TOTAL CA: CPT

## 2023-01-30 PROCEDURE — 2700000000 HC OXYGEN THERAPY PER DAY

## 2023-01-30 PROCEDURE — 6370000000 HC RX 637 (ALT 250 FOR IP): Performed by: INTERNAL MEDICINE

## 2023-01-30 PROCEDURE — 83605 ASSAY OF LACTIC ACID: CPT

## 2023-01-30 PROCEDURE — 6360000002 HC RX W HCPCS

## 2023-01-30 PROCEDURE — 2580000003 HC RX 258: Performed by: FAMILY MEDICINE

## 2023-01-30 PROCEDURE — 82330 ASSAY OF CALCIUM: CPT

## 2023-01-30 PROCEDURE — 93010 ELECTROCARDIOGRAM REPORT: CPT | Performed by: INTERNAL MEDICINE

## 2023-01-30 PROCEDURE — 36592 COLLECT BLOOD FROM PICC: CPT

## 2023-01-30 PROCEDURE — 99291 CRITICAL CARE FIRST HOUR: CPT | Performed by: INTERNAL MEDICINE

## 2023-01-30 PROCEDURE — 2000000000 HC ICU R&B

## 2023-01-30 PROCEDURE — 80053 COMPREHEN METABOLIC PANEL: CPT

## 2023-01-30 PROCEDURE — 6360000002 HC RX W HCPCS: Performed by: FAMILY MEDICINE

## 2023-01-30 PROCEDURE — 85025 COMPLETE CBC W/AUTO DIFF WBC: CPT

## 2023-01-30 PROCEDURE — 84484 ASSAY OF TROPONIN QUANT: CPT

## 2023-01-30 PROCEDURE — 94003 VENT MGMT INPAT SUBQ DAY: CPT

## 2023-01-30 PROCEDURE — 86682 HELMINTH ANTIBODY: CPT

## 2023-01-30 PROCEDURE — 82962 GLUCOSE BLOOD TEST: CPT

## 2023-01-30 PROCEDURE — 6370000000 HC RX 637 (ALT 250 FOR IP)

## 2023-01-30 RX ORDER — DEXTROSE MONOHYDRATE 50 MG/ML
INJECTION, SOLUTION INTRAVENOUS CONTINUOUS
Status: ACTIVE | OUTPATIENT
Start: 2023-01-30 | End: 2023-01-30

## 2023-01-30 RX ORDER — MIDAZOLAM HYDROCHLORIDE 1 MG/ML
1-10 INJECTION, SOLUTION INTRAVENOUS CONTINUOUS
Status: DISCONTINUED | OUTPATIENT
Start: 2023-01-30 | End: 2023-01-30

## 2023-01-30 RX ORDER — CHLORHEXIDINE GLUCONATE 0.12 MG/ML
15 RINSE ORAL 2 TIMES DAILY
Status: DISCONTINUED | OUTPATIENT
Start: 2023-01-30 | End: 2023-01-30

## 2023-01-30 RX ORDER — POTASSIUM CHLORIDE 20 MEQ/1
40 TABLET, EXTENDED RELEASE ORAL ONCE
Status: COMPLETED | OUTPATIENT
Start: 2023-01-30 | End: 2023-01-30

## 2023-01-30 RX ORDER — DEXTROSE MONOHYDRATE 100 MG/ML
INJECTION, SOLUTION INTRAVENOUS CONTINUOUS PRN
Status: DISCONTINUED | OUTPATIENT
Start: 2023-01-30 | End: 2023-02-01 | Stop reason: HOSPADM

## 2023-01-30 RX ORDER — POTASSIUM CHLORIDE 7.45 MG/ML
10 INJECTION INTRAVENOUS
Status: COMPLETED | OUTPATIENT
Start: 2023-01-30 | End: 2023-01-30

## 2023-01-30 RX ADMIN — Medication 10 MEQ: at 06:21

## 2023-01-30 RX ADMIN — CHLORHEXIDINE GLUCONATE 15 ML: 1.2 RINSE ORAL at 09:01

## 2023-01-30 RX ADMIN — Medication 10 MEQ: at 07:30

## 2023-01-30 RX ADMIN — THIAMINE HYDROCHLORIDE 100 MG: 100 INJECTION, SOLUTION INTRAMUSCULAR; INTRAVENOUS at 09:00

## 2023-01-30 RX ADMIN — AMPICILLIN SODIUM AND SULBACTAM SODIUM 3000 MG: 2; 1 INJECTION, POWDER, FOR SOLUTION INTRAMUSCULAR; INTRAVENOUS at 13:52

## 2023-01-30 RX ADMIN — AMPICILLIN SODIUM AND SULBACTAM SODIUM 3000 MG: 2; 1 INJECTION, POWDER, FOR SOLUTION INTRAMUSCULAR; INTRAVENOUS at 19:33

## 2023-01-30 RX ADMIN — AMPICILLIN SODIUM AND SULBACTAM SODIUM 3000 MG: 2; 1 INJECTION, POWDER, FOR SOLUTION INTRAMUSCULAR; INTRAVENOUS at 09:09

## 2023-01-30 RX ADMIN — ENOXAPARIN SODIUM 40 MG: 100 INJECTION SUBCUTANEOUS at 09:00

## 2023-01-30 RX ADMIN — SODIUM CHLORIDE, PRESERVATIVE FREE 10 ML: 5 INJECTION INTRAVENOUS at 09:01

## 2023-01-30 RX ADMIN — AMPICILLIN SODIUM AND SULBACTAM SODIUM 3000 MG: 2; 1 INJECTION, POWDER, FOR SOLUTION INTRAMUSCULAR; INTRAVENOUS at 02:23

## 2023-01-30 RX ADMIN — Medication 2 MG/HR: at 01:25

## 2023-01-30 RX ADMIN — Medication 10 MEQ: at 09:00

## 2023-01-30 RX ADMIN — POTASSIUM CHLORIDE 40 MEQ: 1500 TABLET, EXTENDED RELEASE ORAL at 19:32

## 2023-01-30 RX ADMIN — DEXTROSE MONOHYDRATE: 50 INJECTION, SOLUTION INTRAVENOUS at 06:45

## 2023-01-30 ASSESSMENT — PULMONARY FUNCTION TESTS
PIF_VALUE: 11
PIF_VALUE: 15
PIF_VALUE: 19
PIF_VALUE: 21
PIF_VALUE: 15
PIF_VALUE: 22
PIF_VALUE: 22
PIF_VALUE: 21
PIF_VALUE: 12
PIF_VALUE: 20
PIF_VALUE: 20
PIF_VALUE: 19
PIF_VALUE: 19
PIF_VALUE: 26

## 2023-01-30 NOTE — CONSULTS
[facility]  Internal Medicine Residency Program  Consult  MICU    Patient:  Micheal Garcia 43 y.o. female MRN: [id number]     Date of Service: 1/29/2023    Hospital Day: 1    History of Present Illness   Micheal Garcia is a 43 y.o. female with a PMHx polysubstance abuse (cocaine, marijuana, heroin) w/ overdoses, asthma and ?thyroid disease presented with CC of drug overdose since 1/29. On arrival to the ED, the patient had pinpoint pupils and sonorous respirations. She was initially placed on a nonrebreather and was given narcan. She was unable to follow commands so she was intubated for airway protection. Vitals were significant for HR 93, /66, temp 92.3. Labs significant for creatinine 1, AG 17, LA 5.7, , troponin 15, proBNP 141, CK1 40, ethanol neg, WBC 12.5, UA no signs of infection, ABG pH 7.202, CO2 53, bicarb 21. EKG showed prolonged QTC, chest x-ray showed no signs of infection. CT head showed no acute abnormalities. UDS was positive for cannabis, cocaine, and fentanyl. A central line was inserted and she received 2L NS, magnesium sulfate, and unasyn. She was transferred to the MICU for further work-up and management. Past Medical History:  No past medical history on file. Past Surgical History:    No past surgical history on file. Medications Prior to Admission:    Prior to Admission medications    Not on File       Allergies:  Patient has no allergy information on record. Social History:   TOBACCO:   has no history on file for tobacco use. ETOH:   has no history on file for alcohol use. OCCUPATION: -      Family History:   No family history on file.     REVIEW OF SYSTEMS:  Unable to obtain ROS, patient intubated and sedated     Physical Exam     VITAL SIGNS:  BP (!) 135/98   Pulse 92   Temp (!) (P) 92.5 °F (33.6 °C) (Bladder)   Resp 20   Ht 5' 4\" (1.626 m)   Wt 120 lb (54.4 kg)   SpO2 100%   BMI 20.60 kg/m²   Tmax over 24 hours:  Temp (24hrs), Av.4 °F (33.6 °C), Min:92.3 °F (33.5 °C), Max:92.5 °F (33.6 °C)      No intake or output data in the 24 hours ending 23  Wt Readings from Last 2 Encounters:   23 120 lb (54.4 kg)     Body mass index is 20.6 kg/m². Sedatives: versed    Oxygen: mechanical ventilation 350/20/45/5    ABG:     Lab Results   Component Value Date/Time    PH 7.217 2023 07:03 PM    PCO2 53.1 2023 07:03 PM    PO2 >500.0 2023 07:03 PM    HCO3 21.1 2023 07:03 PM    BE -7.1 2023 07:03 PM    THB 14.6 2023 07:03 PM    O2SAT 99.6 2023 07:03 PM       Physical Exam:  General Appearance: alert, appears stated age, and cooperative, intubated  Neck: supple, symmetrical, trachea midline  Lung: clear to auscultation bilaterally  Heart: regular rate and rhythm, S1, S2 normal, no murmur, click, rub or gallop  Abdomen: soft, non-tender; bowel sounds normal; no masses,  no organomegaly  Extremities:  extremities normal, atraumatic, no cyanosis or edema  Musculoskeletal: No joint swelling, no muscle tenderness. ROM normal in all joints of extremities. Neurologic: Mental status: awakens to voice, following commands     Lines: R femoral CVC         Labs and Imaging Studies     Basic Labs    Complete Blood Count:   Recent Labs     23  1820   WBC 12.5*   HGB 13.9   HCT 43.6           Last 3 Blood Glucose:   No results for input(s): GLUCOSE in the last 72 hours. PT/INR:  No results found for: PROTIME, INR  PTT:  No results found for: APTT, PTT    Comprehensive Metabolic Profile:   No results for input(s): NA, K, CL, CO2, BUN, CREATININE, GLUCOSE, CALCIUM, PROT, LABALBU, BILITOT, ALKPHOS, AST, ALT in the last 72 hours. Magnesium: No results found for: MG  Phosphorus: No results found for: PHOS  Ionized Calcium: No results found for: CAION   Troponin: No results for input(s): TROPONINI in the last 72 hours.     Imaging Studies:    XR CHEST PORTABLE    Result Date: 1/29/2023  EXAMINATION: ONE XRAY VIEW OF THE CHEST 1/29/2023 6:31 pm COMPARISON: None. HISTORY: ORDERING SYSTEM PROVIDED HISTORY: Post tube TECHNOLOGIST PROVIDED HISTORY: Reason for exam:->Post tube What reading provider will be dictating this exam?->CRC FINDINGS: The lungs are without acute focal process. There is no effusion or pneumothorax. The cardiomediastinal silhouette is without acute process. The osseous structures are without acute process. Enteric tube courses past the GE junction, the tip is not seen. ET tube tip 3.5 cm from the toyin. ET tube tip 3.5 cm from the toyin. XR ABDOMEN FOR NG/OG/NE TUBE PLACEMENT    Result Date: 1/29/2023  EXAMINATION: ONE SUPINE XRAY VIEW(S) OF THE ABDOMEN 1/29/2023 6:31 pm COMPARISON: None. HISTORY: ORDERING SYSTEM PROVIDED HISTORY: Confirmation of course of NG/OG/NE tube and location of tip of tube TECHNOLOGIST PROVIDED HISTORY: Reason for exam:->Confirmation of course of NG/OG/NE tube and location of tip of tube Portable? ->Yes What reading provider will be dictating this exam?->CRC FINDINGS: Nonspecific bowel gas pattern without evidence of obstruction. No abnormal calcifications. No acute osseous abnormality. Enteric tube tip in the proximal to mid gastric body. Visualized lungs are normal.     Enteric tube tip in the proximal to mid gastric body       Resident's Assessment and Plan     Drug overdose  Patient with multiple admissions 2/2 drug overdose in past  Ethanol negative, SDS negative  UDS (+) cocaine, marijuana, fentanyl   Patient received narcan in ED    2. Acute hypoxic respiratory failure 2/2 drug overdose  Initial ABG:   Patient intubated in ED  Current ventilator settings: 350/20/45/5  PLAN  Continue mechanical ventilation, wean as tolerated  Daily CXR, ABG    3. Elevated troponin likely 2/2 demand ischemia  Initial troponin 15  PLAN  Follow repeat troponin    4. Qtc prolongation  Qtc on presentation 546    5.  HAGMA likely 2/2 lactic acidosis  Lactic acid 5.7, anion gap 17  PLAN  Follow repeat lactic acid, CMP    6. Leukocytosis  Likely reactive  WBC 12.5  PLAN  Rule out infectious source: follow respiratory culture  Antibiotic coverage with unasyn for possible aspiration pneumonia     7. Hx asthma    8. Hx thyroid disease? Patient with thyroid cyst removed in past  PLAN  Follow TSH    9.  Hx polysubstance abuse  UDS (+) cocaine, marijuana, fentanyl        DVT prophylaxis: lovenox  GI prophylaxis: -  Diet: NPO  Code status: Full code  Disposition: Admitted to ICU     Patricio Arcos MD PGY-1  Attending physician: Dr. Antoni Adamson

## 2023-01-30 NOTE — PROGRESS NOTES
01/30/23 0825   NICU Vent Information   Vent Type 980   Vent Mode (S)  PS   Vt (Measured) 426 mL   Rate Measured 13 br/min   Minute Volume (L/min) 4.83 Liters   Pressure Support (S)  10 cmH20   FiO2  40 %   Peak Inspiratory Pressure (cmH2O) 15 cmH2O   I:E Ratio 1:2.90   Sensitivity 3   PEEP/CPAP (cmH2O) 5   Mean Airway Pressure (cmH2O) 7 cmH20   Additional Respiratory Assessments   Heart Rate 75   Resp 15   SpO2 100 %   Position Semi-Thomas's   Humidification Source Heated wire   Humidification Temp 37   Vent Alarm Settings   High Pressure (cmH2O) 45 cmH2O   Low Minute Volume (lpm) 4 L/min   High Minute Volume (lpm) 20 L/min   Low Exhaled Vt (ml) 250 mL   High Exhaled Vt (ml) 800 mL   RR High (bpm) 35 br/min   Apnea (secs) 20 secs

## 2023-01-30 NOTE — CARE COORDINATION
Care Coordination: LOS 1. Readmit score 8%. Initially to ER. GCS 3-8 at arrival with opiate OD, hyperthermia, aspiration, acute encephalopathy, w/ polysubstance abuse (Cocaine, Marijuana, Heroin). Intubated, Propofol and sent to ICU. Extubated to 3 ltr noted 11:54, Unasyn. Spoke with pt at the bedside to discuss transition of care upon discharge. Lives at friend's house, 1 story, 6 steps to enter. No hx of hhc or DME. Hx of [facility] and transitioned to 6 months at [address] [place] [address]. Hx of [facility], states inpatient for 3 days and then released without follow up. Pt is willing to speak to Peer recovery, referral made to [facility]. Pt states she smokes cigarettes, Marijuana, and Crack. She states she believes crack had other stuff in it. Confirmed sister Sharon is PDM and will pick her up at discharge. Also feels she can get ride from friends. She is [employer], so also provide a ride at [phone]. Follows with Dr Jackie Johns who she recently saw for first time. Uses [facility].  Confirms if she is unable to make her own decisions, sister Sharon is PDM    Cecil Chaney

## 2023-01-30 NOTE — PROGRESS NOTES
Patient admitted from ED on vent. Antibiotic and propofol infusing. Moved to bed from cart and assessment completed. Nsr with stable BP. Will open eyes to  name but only shakes head back and forth, will not follow simple commands at this time. Residents notified of arrival. No family present at this time.

## 2023-01-30 NOTE — ACP (ADVANCE CARE PLANNING)
Advance Care Planning   Healthcare Decision Maker:    Primary Decision Maker: Kaycee Sanabria - Brother/Sister - [phone]    Click here to complete Healthcare Decision Makers including selection of the Healthcare Decision Maker Relationship (ie \"Primary\").

## 2023-01-30 NOTE — CARE COORDINATION
[facility] Support Note    Name: Tiki Fulton  Date: 1/30/2023    Chief Complaint   Patient presents with    Drug Overdose     Pulled from car unresponsive, blue, agonal, \"took heroine\"       Peer Support met with patient. [x] Support and education provided  [] Resources provided   [] Treatment referral:   [] Other:   [] Patient declined peer recovery services     Referred By: Shaka Vuong (SUZANNE)    Notes: Patient will be followed up with  [facility]. She is agreeable to treatment.      Signed: Mercy Newman, 1/30/2023

## 2023-01-30 NOTE — PROGRESS NOTES
Patient was extubated to 3 liters/min via nasal cannula. Breath Sounds post extubation were clear diminished. Stridor was not present post extubation. SPO2 was 99%.       Performed by  Angela Weinstein RCP

## 2023-01-30 NOTE — PROGRESS NOTES
Spontaneous Parameters performed on PS 5.    VT = 291 ml  f = 18  B/M  Ve = 5.25 L/M  NIF = -19  cmH2O  VC = N/A   RSBI = 61.8    Pt had audible cuff leak.      Performed by Chayo Rodriguez RCP

## 2023-01-30 NOTE — ED NOTES
Levo order on hand in case patient hypotensive.  Held at this time per Dr. Mesha Grover, RN  01/29/23 0666

## 2023-01-30 NOTE — PROGRESS NOTES
Stage  CO CI HR SV SVI   Baseline  4.5 67  68   Challenge  5.3 73  75   Result (%?)  17.7   10.4    Patient is fluid responsive.

## 2023-01-30 NOTE — ED NOTES
Sister at [facility], updated on poc. Pt remains with stable VS. This nurse called report to MICU. RT at [facility] for transfer.       Ruben Farfan RN  01/29/23 2025

## 2023-01-30 NOTE — H&P
Hospitalist History & Physical      PCP: No primary care provider on file. Date of Service: Pt seen/examined on 1/29/2023     Chief Complaint:  had concerns including Drug Overdose (Pulled from car unresponsive, blue, agonal, \"took heroine\"). History Of Present Illness:    Ms. Vidhya Haro, a 43y.o. year old female  who  has no past medical history on file. 77-year-old female patient presented to emergency department for possible overdose. Patient was pulled out of the car by nursing staff. Patient on arrival with pinpoint pupils, sonorous respirations, GCS of 3 initially. Nonrebreather promptly placed on patient, IV access obtained. 4 mg of IV Narcan given, patient more responsive, GCS of 9 but not appropriately following commands, concern for possible airway compromise decision made to intubate. History limited due to patient's acuity and condition. Laboratory studies demonstrate lactic acid 5.7, glucose 343, WBC 12.5. Patient be admitted to the ICU. No past medical history on file. No past surgical history on file. Prior to Admission medications    Not on File         Allergies:  Patient has no allergy information on record. Social History:    TOBACCO:   has no history on file for tobacco use. ETOH:   has no history on file for alcohol use. Family History:    Reviewed in detail and negative for DM, CAD, Cancer, CVA. Positive as follows\"  No family history on file. REVIEW OF SYSTEMS:   Pertinent positives as noted in the HPI. All other systems reviewed and negative. PHYSICAL EXAM:  BP (!) 147/99   Pulse 88   Temp (!) (P) 92.5 °F (33.6 °C) (Bladder)   Resp 20   Ht 5' 4\" (1.626 m)   Wt 120 lb (54.4 kg)   SpO2 100%   BMI 20.60 kg/m²   General appearance: Intubated and sedated  HEENT: Normal cephalic, atraumatic without obvious deformity  Neck: Supple, with full range of motion. No jugular venous distention. Trachea midline.   Respiratory: Mechanically ventilated  Cardiovascular: Regular rate and rhythm  Abdomen: Soft, nondistended  Musculoskeletal: No clubbing, cyanosis, edema of bilateral lower extremities. Brisk capillary refill. Skin: Normal skin color. No rashes or lesions. Neurologic: Intubated and sedated      CBC:   Recent Labs     01/29/23 1820   WBC 12.5*   RBC 4.54   HGB 13.9   HCT 43.6   MCV 96.0   RDW 13.0        BMP:   Recent Labs     01/29/23 1820      K 3.5      CO2 23   BUN 10   CREATININE 1.0   MG 2.5     LFT:  Recent Labs     01/29/23 1820   PROT 5.9*   ALKPHOS 76   ALT 15   AST 26   BILITOT 0.5     CE:  Recent Labs     01/29/23 1820   CKTOTAL 140     PT/INR: No results for input(s): INR, APTT in the last 72 hours. BNP: No results for input(s): BNP in the last 72 hours. ESR: No results found for: SEDRATE  CRP: No results found for: CRP  D Dimer: No results found for: DDIMER   Folate and B12: No results found for: [id number], No results found for: FOLATE  Lactic Acid: No results found for: LACTA  Thyroid Studies: No results found for: TSH, T5IOETI, W4WELGY, THYROIDAB    Oupatient labs:  No results found for: CHOL, TRIG, HDL, LDLCALC, TSH, PSA, INR, LABA1C    Urinalysis:    Lab Results   Component Value Date/Time    NITRU Negative 01/29/2023 06:20 PM    BLOODU Negative 01/29/2023 06:20 PM    SPECGRAV 1.020 01/29/2023 06:20 PM    GLUCOSEU 500 01/29/2023 06:20 PM       Imaging:  CT HEAD WO CONTRAST    Result Date: 1/29/2023  EXAMINATION: CT OF THE HEAD WITHOUT CONTRAST  1/29/2023 6:50 pm TECHNIQUE: CT of the head was performed without the administration of intravenous contrast. Automated exposure control, iterative reconstruction, and/or weight based adjustment of the mA/kV was utilized to reduce the radiation dose to as low as reasonably achievable. COMPARISON: None. HISTORY: ORDERING SYSTEM PROVIDED HISTORY: AMS TECHNOLOGIST PROVIDED HISTORY: Has a \"code stroke\" or \"stroke alert\" been called? ->No Reason for exam:->AMS Decision Support Exception - unselect if not a suspected or confirmed emergency medical condition->Emergency Medical Condition (MA) What reading provider will be dictating this exam?->CRC FINDINGS: BRAIN/VENTRICLES: There is no acute intracranial hemorrhage, mass effect or midline shift. No abnormal extra-axial fluid collection. The gray-white differentiation is maintained without evidence of an acute infarct. There is no evidence of hydrocephalus. ORBITS: The visualized portion of the orbits demonstrate no acute abnormality. SINUSES: The visualized paranasal sinuses and mastoid air cells demonstrate no acute abnormality. SOFT TISSUES/SKULL:  No acute abnormality of the visualized skull or soft tissues. No acute intracranial abnormality. XR CHEST PORTABLE    Result Date: 1/29/2023  EXAMINATION: ONE XRAY VIEW OF THE CHEST 1/29/2023 6:31 pm COMPARISON: None. HISTORY: ORDERING SYSTEM PROVIDED HISTORY: Post tube TECHNOLOGIST PROVIDED HISTORY: Reason for exam:->Post tube What reading provider will be dictating this exam?->CRC FINDINGS: The lungs are without acute focal process. There is no effusion or pneumothorax. The cardiomediastinal silhouette is without acute process. The osseous structures are without acute process. Enteric tube courses past the GE junction, the tip is not seen. ET tube tip 3.5 cm from the toyin. ET tube tip 3.5 cm from the toyin. XR ABDOMEN FOR NG/OG/NE TUBE PLACEMENT    Result Date: 1/29/2023  EXAMINATION: ONE SUPINE XRAY VIEW(S) OF THE ABDOMEN 1/29/2023 6:31 pm COMPARISON: None. HISTORY: ORDERING SYSTEM PROVIDED HISTORY: Confirmation of course of NG/OG/NE tube and location of tip of tube TECHNOLOGIST PROVIDED HISTORY: Reason for exam:->Confirmation of course of NG/OG/NE tube and location of tip of tube Portable? ->Yes What reading provider will be dictating this exam?->CRC FINDINGS: Nonspecific bowel gas pattern without evidence of obstruction. No abnormal calcifications. No acute osseous abnormality. Enteric tube tip in the proximal to mid gastric body. Visualized lungs are normal.     Enteric tube tip in the proximal to mid gastric body       ASSESSMENT:  -Drug overdose  -Acute respiratory failure  -Lactic acidosis  -Leukocytosis      PLAN:  -Admit to the ICU  -Consult critical care  -Mechanical ventilation  -Normal saline under 50 MLS per hour  -Recheck lactic acid levels        Diet: No diet orders on file  Code Status: No Order  Surrogate decision maker confirmed with patient:   Extended Emergency Contact Information  Primary Emergency Contact: Sharon Carroll  Mobile Phone: [phone]  Relation: Brother/Sister  Preferred language: English   needed? No    DVT Prophylaxis: []Lovenox []Heparin []PCD [] [address] []Encouraged ambulation  Disposition: []Med/Surg [] Intermediate [] ICU/CCU  Admit status: [] Observation [] Inpatient     +++++++++++++++++++++++++++++++++++++++++++++++++  Hedy Boast, DO  +++++++++++++++++++++++++++++++++++++++++++++++++  NOTE: This report was transcribed using voice recognition software. Every effort was made to ensure accuracy; however, inadvertent computerized transcription errors may be present.

## 2023-01-30 NOTE — ED NOTES
Pts sister Sharon Carroll called for update on poc.  Advised she will be on her way momentarily     Xander Meraz RN  01/29/23 1912

## 2023-01-31 ENCOUNTER — APPOINTMENT (OUTPATIENT)
Dept: GENERAL RADIOLOGY | Age: 43
DRG: 812 | End: 2023-01-31
Payer: COMMERCIAL

## 2023-01-31 LAB
ALBUMIN SERPL-MCNC: 3 G/DL (ref 3.5–5.2)
ALP BLD-CCNC: 56 U/L (ref 35–104)
ALT SERPL-CCNC: 17 U/L (ref 0–32)
ANION GAP SERPL CALCULATED.3IONS-SCNC: 6 MMOL/L (ref 7–16)
AST SERPL-CCNC: 19 U/L (ref 0–31)
BASOPHILS ABSOLUTE: 0.05 E9/L (ref 0–0.2)
BASOPHILS RELATIVE PERCENT: 0.6 % (ref 0–2)
BILIRUB SERPL-MCNC: 0.3 MG/DL (ref 0–1.2)
BUN BLDV-MCNC: 9 MG/DL (ref 6–20)
CALCIUM SERPL-MCNC: 7.8 MG/DL (ref 8.6–10.2)
CHLORIDE BLD-SCNC: 107 MMOL/L (ref 98–107)
CO2: 27 MMOL/L (ref 22–29)
CREAT SERPL-MCNC: 1 MG/DL (ref 0.5–1)
EOSINOPHILS ABSOLUTE: 0.34 E9/L (ref 0.05–0.5)
EOSINOPHILS RELATIVE PERCENT: 4.1 % (ref 0–6)
GFR SERPL CREATININE-BSD FRML MDRD: >60 ML/MIN/1.73
GLUCOSE BLD-MCNC: 90 MG/DL (ref 74–99)
HCT VFR BLD CALC: 34.6 % (ref 34–48)
HEMOGLOBIN: 11.3 G/DL (ref 11.5–15.5)
IMMATURE GRANULOCYTES #: 0.02 E9/L
IMMATURE GRANULOCYTES %: 0.2 % (ref 0–5)
LYMPHOCYTES ABSOLUTE: 2.89 E9/L (ref 1.5–4)
LYMPHOCYTES RELATIVE PERCENT: 34.5 % (ref 20–42)
MAGNESIUM: 1.9 MG/DL (ref 1.6–2.6)
MCH RBC QN AUTO: 30.5 PG (ref 26–35)
MCHC RBC AUTO-ENTMCNC: 32.7 % (ref 32–34.5)
MCV RBC AUTO: 93.3 FL (ref 80–99.9)
MONOCYTES ABSOLUTE: 0.52 E9/L (ref 0.1–0.95)
MONOCYTES RELATIVE PERCENT: 6.2 % (ref 2–12)
NEUTROPHILS ABSOLUTE: 4.55 E9/L (ref 1.8–7.3)
NEUTROPHILS RELATIVE PERCENT: 54.4 % (ref 43–80)
PDW BLD-RTO: 13.2 FL (ref 11.5–15)
PHOSPHORUS: 2.2 MG/DL (ref 2.5–4.5)
PLATELET # BLD: 269 E9/L (ref 130–450)
PMV BLD AUTO: 9.2 FL (ref 7–12)
POTASSIUM SERPL-SCNC: 4.2 MMOL/L (ref 3.5–5)
PROCALCITONIN: 0.61 NG/ML (ref 0–0.08)
RBC # BLD: 3.71 E12/L (ref 3.5–5.5)
SODIUM BLD-SCNC: 140 MMOL/L (ref 132–146)
TOTAL PROTEIN: 4.9 G/DL (ref 6.4–8.3)
WBC # BLD: 8.4 E9/L (ref 4.5–11.5)

## 2023-01-31 PROCEDURE — 6360000002 HC RX W HCPCS

## 2023-01-31 PROCEDURE — 6360000002 HC RX W HCPCS: Performed by: FAMILY MEDICINE

## 2023-01-31 PROCEDURE — 85025 COMPLETE CBC W/AUTO DIFF WBC: CPT

## 2023-01-31 PROCEDURE — 2580000003 HC RX 258: Performed by: FAMILY MEDICINE

## 2023-01-31 PROCEDURE — 83735 ASSAY OF MAGNESIUM: CPT

## 2023-01-31 PROCEDURE — 80053 COMPREHEN METABOLIC PANEL: CPT

## 2023-01-31 PROCEDURE — 2580000003 HC RX 258

## 2023-01-31 PROCEDURE — 2060000000 HC ICU INTERMEDIATE R&B

## 2023-01-31 PROCEDURE — 84145 PROCALCITONIN (PCT): CPT

## 2023-01-31 PROCEDURE — 6360000002 HC RX W HCPCS: Performed by: INTERNAL MEDICINE

## 2023-01-31 PROCEDURE — 36415 COLL VENOUS BLD VENIPUNCTURE: CPT

## 2023-01-31 PROCEDURE — 71045 X-RAY EXAM CHEST 1 VIEW: CPT

## 2023-01-31 PROCEDURE — 2580000003 HC RX 258: Performed by: INTERNAL MEDICINE

## 2023-01-31 PROCEDURE — 84100 ASSAY OF PHOSPHORUS: CPT

## 2023-01-31 PROCEDURE — 99233 SBSQ HOSP IP/OBS HIGH 50: CPT | Performed by: INTERNAL MEDICINE

## 2023-01-31 RX ADMIN — AMPICILLIN SODIUM AND SULBACTAM SODIUM 3000 MG: 2; 1 INJECTION, POWDER, FOR SOLUTION INTRAMUSCULAR; INTRAVENOUS at 07:32

## 2023-01-31 RX ADMIN — SODIUM CHLORIDE, PRESERVATIVE FREE 10 ML: 5 INJECTION INTRAVENOUS at 08:57

## 2023-01-31 RX ADMIN — AMPICILLIN SODIUM AND SULBACTAM SODIUM 3000 MG: 2; 1 INJECTION, POWDER, FOR SOLUTION INTRAMUSCULAR; INTRAVENOUS at 03:24

## 2023-01-31 RX ADMIN — ENOXAPARIN SODIUM 40 MG: 100 INJECTION SUBCUTANEOUS at 08:52

## 2023-01-31 RX ADMIN — THIAMINE HYDROCHLORIDE 100 MG: 100 INJECTION, SOLUTION INTRAMUSCULAR; INTRAVENOUS at 08:52

## 2023-01-31 RX ADMIN — AMPICILLIN SODIUM AND SULBACTAM SODIUM 3000 MG: 2; 1 INJECTION, POWDER, FOR SOLUTION INTRAMUSCULAR; INTRAVENOUS at 20:22

## 2023-01-31 RX ADMIN — AMPICILLIN SODIUM AND SULBACTAM SODIUM 3000 MG: 2; 1 INJECTION, POWDER, FOR SOLUTION INTRAMUSCULAR; INTRAVENOUS at 15:03

## 2023-01-31 ASSESSMENT — PAIN SCALES - GENERAL
PAINLEVEL_OUTOF10: 0

## 2023-01-31 NOTE — CARE COORDINATION
Care Coordination: LOS 2. Discussed with RN , pt  room air, doing well and checking on dc plan. Spoke to pt and she is agreeable to Inpatient detox transitioning to outpt. Her sister is RN at Dr Gabi Warner office. I left a message for sister as well. Spoke to [employer] in Peer recovery and she is assisting with detox facility. Rafi Pedro    Addendum: spoke to [facility] ( pt and sister Sharon have given me permission to speak to her and add her on for decision maker as well)  She is upset, states pt cannot be discharged today, they needed some warning as plan was to get her to [facility] in [place]. Her  planned to pick her up tomorrow at earliest and take her via car. They have a brother in [place] and he is 8 year recovering addict and works with churches and jails, enrolling addicts in recovery programs. Discussed if she declines to go, she states we can get there there and if she refuses, she can stay with brother in [place]. They cannot pick her up today, everyone is working and will be available to pick her up first thing in morning if peer recovery is not able to get her into a program today. Peng Tapia and Sharon are both RNs. . Will check transfer order for now and then dc if able to get to inpatient rehab today.     Rafi Pedro

## 2023-01-31 NOTE — PROGRESS NOTES
[address] [facility]  Department of Internal Medicine   Internal Medicine Residency   MICU Progress Note    Patient:  Alonzo Oviedo 43 y.o. female  MRN: [id number]     Date of Service: 1/31/2023    Allergy: Patient has no known allergies. Subjective     Patient was seen and examined at bedside this morning. She states she is doing much better today and is breathing well since she was extubated. Denies shortness of breath, chest pain, nausea, vomiting. 24h change: Patient was extubated yesterday   ROS: Denies Fever/chills/CP/SOB/N/V/D/C/Dysuria/Blood in stool or urine  Objective     VS: BP (!) 91/55   Pulse 64   Temp 99.2 °F (37.3 °C)   Resp 19   Ht 5' 4\" (1.626 m)   Wt 123 lb 7.3 oz (56 kg)   SpO2 100%   BMI 21.19 kg/m²           I & O - 24hr:   Intake/Output Summary (Last 24 hours) at 1/31/2023 0152  Last data filed at 1/30/2023 1800  Gross per 24 hour   Intake 469.56 ml   Output 1080 ml   Net -610.44 ml       Physical Exam:  General Appearance: alert, appears stated age, and cooperative,  Neck: supple, symmetrical, trachea midline  Lung: clear to auscultation bilaterally  Heart: regular rate and rhythm, S1, S2 normal, no murmur, click, rub or gallop  Abdomen: soft, non-tender; bowel sounds normal; no masses,  no organomegaly  Extremities:  extremities normal, atraumatic, no cyanosis or edema  Musculoskeletal: No joint swelling, no muscle tenderness. ROM normal in all joints of extremities.    Neurologic: Mental status: A&O x4       Lines  R femoral CVC- inserted 1/29/23    ABG:     Lab Results   Component Value Date/Time    PH 7.434 01/30/2023 04:32 AM    PCO2 35.5 01/30/2023 04:32 AM    PO2 90.7 01/30/2023 04:32 AM    HCO3 23.2 01/30/2023 04:32 AM    BE -0.5 01/30/2023 04:32 AM    THB 12.9 01/30/2023 04:32 AM    O2SAT 96.8 01/30/2023 04:32 AM        Medications     Nutrition:   Regular adult diet   ATB:   Antibiotics  Days   unasyn 2               Patient currently has   DVT prophylaxis/ GI prophylaxis,      Labs   CBC:   Lab Results   Component Value Date/Time    WBC 13.7 01/30/2023 04:15 AM    RBC 3.75 01/30/2023 04:15 AM    HGB 11.6 01/30/2023 04:15 AM    HCT 34.5 01/30/2023 04:15 AM    MCV 92.0 01/30/2023 04:15 AM    MCH 30.9 01/30/2023 04:15 AM    MCHC 33.6 01/30/2023 04:15 AM    RDW 13.0 01/30/2023 04:15 AM     01/30/2023 04:15 AM    MPV 9.3 01/30/2023 04:15 AM     CMP:    Lab Results   Component Value Date/Time     01/30/2023 03:08 PM    K 3.4 01/30/2023 03:08 PM     01/30/2023 03:08 PM    CO2 21 01/30/2023 03:08 PM    BUN 7 01/30/2023 03:08 PM    CREATININE 1.0 01/30/2023 03:08 PM    LABGLOM >60 01/30/2023 03:08 PM    GLUCOSE 193 01/30/2023 03:08 PM    PROT 4.8 01/30/2023 04:15 AM    LABALBU 3.1 01/30/2023 04:15 AM    CALCIUM 7.5 01/30/2023 03:08 PM    BILITOT 0.5 01/30/2023 04:15 AM    ALKPHOS 54 01/30/2023 04:15 AM    AST 26 01/30/2023 04:15 AM    ALT 19 01/30/2023 04:15 AM       Resident's Assessment and Plan     Drug overdose  Patient with multiple admissions 2/2 drug overdose in past  Ethanol negative, SDS negative  UDS (+) cocaine, marijuana, fentanyl   Patient received narcan in ED  PLAN  Patient plans to meet with peer recovery      2. Acute hypoxic respiratory failure 2/2 drug overdose- resolved  Patient intubated in ED  Extubated yesterday, now on room air    3. Elevated troponin likely 2/2 demand ischemia  Initial troponin 15 > 42 > 25     4. Qtc prolongation  Qtc on presentation 546     5. HAGMA likely 2/2 lactic acidosis - resolved     6. Leukocytosis- resolved   Likely reactive  WBC 8.4  PLAN  Follow respiratory culture   Repeat procal and CXR, if negative will discontinue antibiotics     7. Hx asthma     8. Hx thyroid disease? Patient with thyroid cyst removed in past  TSH 0.992     9.  Hx polysubstance abuse  UDS (+) cocaine, marijuana, fentanyl         DVT prophylaxis: lovenox  GI prophylaxis: diet   Diet: regular adult diet  Code status: Full code  Disposition: transfer out of ICU    Malia Julian MD, PGY-1    Attending physician: Dr. Tulio Henderson

## 2023-01-31 NOTE — CARE COORDINATION
Care Coordination: LOS 2: Peer recovery spoke to pt and she is now refusing inpt rehab. She will leave literature for her with plan for sister Antonio Johnson and  to pick her up tomorrow, bring clothes and take directly to [place]. Family will speak to patient tonight. Spoke to nurse regarding transfer order. Spoke to Antonio Johnson again, and she is calling [facility] to obtain auth for [facility] in [place]. Plan for transfer today.     Amor Kaiser

## 2023-01-31 NOTE — PROGRESS NOTES
Patient's family has called twice this AM and are concerned about patient being discharged today without going to rehab. Patient's family state that the patient has no clothes with her for discharge and that picking up the patient may not be able to be done until late. Patient's family states that the patient is \"basically homeless\" and only requests from her family \"to be dropped off in [place]. \" Concerns have been delivered to the residents.

## 2023-01-31 NOTE — PROGRESS NOTES
Hospitalist Progress Note      Synopsis: Patient admitted on 1/29/2023     59-year-old female patient presented to emergency department for possible overdose. Patient was pulled out of the car by nursing staff. Patient on arrival with pinpoint pupils, sonorous respirations, GCS of 3 initially. Nonrebreather promptly placed on patient, IV access obtained. 4 mg of IV Narcan given, patient more responsive, GCS of 9 but not appropriately following commands, concern for possible airway compromise decision made to intubate. History limited due to patient's acuity and condition. Laboratory studies demonstrate lactic acid 5.7, glucose 343, WBC 12.5. Patient be admitted to the ICU. Subjective    Extubated. Sore throat and some shortness of breath. States not intentional, otherwise no complaints. Exam:  /62   Pulse 96   Temp 99.4 °F (37.4 °C) (Bladder)   Resp 23   Ht 5' 4\" (1.626 m)   Wt 123 lb 7.3 oz (56 kg)   SpO2 97%   BMI 21.19 kg/m²   General appearance: No apparent distress, appears stated age and cooperative. HEENT: Pupils equal, round, and reactive to light. Conjunctivae/corneas clear. Neck: Supple. No jugular venous distention. Trachea midline. Respiratory:  Normal respiratory effort. Clear to auscultation, bilaterally without Rales/Wheezes/Rhonchi. Cardiovascular: Regular rate and rhythm with normal S1/S2 without murmurs, rubs or gallops. Abdomen: Soft, non-tender, non-distended with normal bowel sounds. Musculoskeletal: No clubbing, cyanosis or edema bilaterally. Brisk capillary refill. 2+ lower extremity pulses (dorsalis pedis).    Skin:  No rashes    Neurologic: awake, alert and following commands     Medications:  Reviewed    Infusion Medications    dextrose      sodium chloride       Scheduled Medications    ketamine  100 mg IntraVENous Once    rocuronium  100 mg IntraVENous Once    naloxone  2 mg IntraVENous Once    sodium chloride flush  10 mL IntraVENous 2 times per day enoxaparin  40 mg SubCUTAneous Daily    ampicillin-sulbactam  3,000 mg IntraVENous Q6H    thiamine  100 mg IntraVENous Daily     PRN Meds: glucose, dextrose bolus **OR** dextrose bolus, glucagon (rDNA), dextrose, fentanNYL, sodium chloride flush, sodium chloride, promethazine **OR** [DISCONTINUED] ondansetron, polyethylene glycol, acetaminophen **OR** acetaminophen    I/O    Intake/Output Summary (Last 24 hours) at 1/30/2023 1940  Last data filed at 1/30/2023 1800  Gross per 24 hour   Intake 1373.04 ml   Output 2870 ml   Net -1496.96 ml       Labs:   Recent Labs     01/29/23 1820 01/30/23  0415   WBC 12.5* 13.7*   HGB 13.9 11.6   HCT 43.6 34.5    276       Recent Labs     01/29/23 1820 01/30/23  0415 01/30/23  1508    143 138   K 3.5 3.5 3.4*    108* 104   CO2 23 23 21*   BUN 10 8 7   CREATININE 1.0 0.7 1.0   CALCIUM 8.6 7.1* 7.5*       Recent Labs     01/29/23 1820 01/30/23  0415   PROT 5.9* 4.8*   ALKPHOS 76 54   ALT 15 19   AST 26 26   BILITOT 0.5 0.5       No results for input(s): INR in the last 72 hours. Recent Labs     01/29/23  1820   CKTOTAL 140       Chronic labs:  Lab Results   Component Value Date    TSH 0.992 01/29/2023       Radiology:  Imaging studies reviewed today. ASSESSMENT:    Principal Problem:    Drug overdose of undetermined intent, initial encounter  Active Problems:    [place] coma scale total score 3-8 ([facility] [address]  Resolved Problems:    * No resolved hospital problems. *    QTC prolognation   Elevated troponin   Hypoxic respiratory failure   Overdose   QTC prolongation   HAGMA    PLAN:    Extubated   Drug cessation encouraged   Supplemental 02 as needed  Unasyn for possible aspiration     Management as per critical care       Diet: ADULT DIET;  Regular  Code Status: Full Code      +++++++++++++++++++++++++++++++++++++++++++++++++  Jay Mix, DO   [facility].  +++++++++++++++++++++++++++++++++++++++++++++++++  NOTE: This report was transcribed using voice recognition software. Every effort was made to ensure accuracy; however, inadvertent computerized transcription errors may be present.

## 2023-01-31 NOTE — PROGRESS NOTES
Nurse to nurse report given, patient to be transferred to room 8509. Patient has been updated and her family.

## 2023-01-31 NOTE — PROGRESS NOTES
Hospitalist Progress Note      Synopsis: Patient admitted on 1/29/2023     51-year-old female patient presented to emergency department for possible overdose. Patient was pulled out of the car by nursing staff. Patient on arrival with pinpoint pupils, sonorous respirations, GCS of 3 initially. Nonrebreather promptly placed on patient, IV access obtained. 4 mg of IV Narcan given, patient more responsive, GCS of 9 but not appropriately following commands, concern for possible airway compromise decision made to intubate. History limited due to patient's acuity and condition. Laboratory studies demonstrate lactic acid 5.7, glucose 343, WBC 12.5. Patient to be admitted to the ICU. Lactic Acidosis resolved. Patient extubated. On Unasyn for aspiration pneumonia. Subjective    Extubated. Sore throat improving. Shortness of breath resolved. Off nasal cannula. Exam:  BP 98/65   Pulse 78   Temp 97.8 °F (36.6 °C) (Temporal)   Resp 16   Ht 5' 4\" (1.626 m)   Wt 123 lb 7.3 oz (56 kg)   SpO2 96%   BMI 21.19 kg/m²   General appearance: No apparent distress, appears stated age and cooperative. HEENT: Pupils equal, round, and reactive to light. Conjunctivae/corneas clear. Neck: Supple. No jugular venous distention. Trachea midline. Respiratory:  Normal respiratory effort. Clear to auscultation, bilaterally without Rales/Wheezes/Rhonchi. Cardiovascular: Regular rate and rhythm with normal S1/S2 without murmurs, rubs or gallops. Abdomen: Soft, non-tender, non-distended with normal bowel sounds. Musculoskeletal: No clubbing, cyanosis or edema bilaterally. Brisk capillary refill. 2+ lower extremity pulses (dorsalis pedis).    Skin:  No rashes    Neurologic: awake, alert and following commands     Medications:  Reviewed    Infusion Medications    dextrose      sodium chloride       Scheduled Medications    ampicillin-sulbactam  3,000 mg IntraVENous Q6H    sodium chloride flush  10 mL IntraVENous 2 times per day    enoxaparin  40 mg SubCUTAneous Daily    thiamine  100 mg IntraVENous Daily     PRN Meds: glucose, dextrose bolus **OR** dextrose bolus, glucagon (rDNA), dextrose, sodium chloride flush, sodium chloride, promethazine **OR** [DISCONTINUED] ondansetron, polyethylene glycol, acetaminophen **OR** acetaminophen    I/O    Intake/Output Summary (Last 24 hours) at 1/31/2023 1809  Last data filed at 1/31/2023 0900  Gross per 24 hour   Intake 1028.02 ml   Output --   Net 1028.02 ml       Labs:   Recent Labs     01/29/23  1820 01/30/23  0415 01/31/23  0410   WBC 12.5* 13.7* 8.4   HGB 13.9 11.6 11.3*   HCT 43.6 34.5 34.6    276 269       Recent Labs     01/30/23  0415 01/30/23  1508 01/31/23  0410    138 140   K 3.5 3.4* 4.2   * 104 107   CO2 23 21* 27   BUN 8 7 9   CREATININE 0.7 1.0 1.0   CALCIUM 7.1* 7.5* 7.8*   PHOS  --   --  2.2*       Recent Labs     01/29/23  1820 01/30/23  0415 01/31/23  0410   PROT 5.9* 4.8* 4.9*   ALKPHOS 76 54 56   ALT 15 19 17   AST 26 26 19   BILITOT 0.5 0.5 0.3       No results for input(s): INR in the last 72 hours. Recent Labs     01/29/23 1820   CKTOTAL 140       Chronic labs:  Lab Results   Component Value Date    TSH 0.992 01/29/2023       Radiology:  Imaging studies reviewed today. ASSESSMENT:    Principal Problem:    Drug overdose of undetermined intent, initial encounter  Active Problems:    [place] coma scale total score 3-8 ([facility] [address]  Resolved Problems:    * No resolved hospital problems. *    QTC prolognation   Elevated troponin   Hypoxic respiratory failure   Overdose   QTC prolongation   HAGMA    PLAN:    Extubated   Drug cessation encouraged   Supplemental 02 as needed  Unasyn for possible aspiration     Management as per critical care   Possible discharge tomorrow. Diet: ADULT DIET;  Regular  Code Status: Full Code      +++++++++++++++++++++++++++++++++++++++++++++++++  Burgess Chidi DO   [address] [place].  +++++++++++++++++++++++++++++++++++++++++++++++++  NOTE: This report was transcribed using voice recognition software. Every effort was made to ensure accuracy; however, inadvertent computerized transcription errors may be present.

## 2023-01-31 NOTE — PROGRESS NOTES
This nurse observed versed still hanging in patient's room. This was wasted, 75 ml, with another RN in the omni cell.

## 2023-01-31 NOTE — CARE COORDINATION
Peer Recovery Support Note    Name: Marek Mayer  Date: 1/31/2023    Chief Complaint   Patient presents with    Drug Overdose     Pulled from car unresponsive, blue, agonal, \"took heroine\"       Peer Support met with patient. [] Support and education provided  [x] Resources provided   [] Treatment referral:   [] Other:   [] Patient declined peer recovery services     Referred By: Tamiko Busby ()    Notes: Patient was not agreeable at this time to go to inpatient. She did take resources and said she will reach out if she changes her mind.      Signed: Kain Villa, 1/31/2023

## 2023-02-01 VITALS
BODY MASS INDEX: 21.08 KG/M2 | HEART RATE: 86 BPM | WEIGHT: 123.46 LBS | OXYGEN SATURATION: 93 % | TEMPERATURE: 97.4 F | DIASTOLIC BLOOD PRESSURE: 66 MMHG | HEIGHT: 64 IN | RESPIRATION RATE: 16 BRPM | SYSTOLIC BLOOD PRESSURE: 97 MMHG

## 2023-02-01 LAB
ALBUMIN SERPL-MCNC: 3.1 G/DL (ref 3.5–5.2)
ALP BLD-CCNC: 59 U/L (ref 35–104)
ALT SERPL-CCNC: 15 U/L (ref 0–32)
ANION GAP SERPL CALCULATED.3IONS-SCNC: 9 MMOL/L (ref 7–16)
AST SERPL-CCNC: 19 U/L (ref 0–31)
BASOPHILS ABSOLUTE: 0.05 E9/L (ref 0–0.2)
BASOPHILS RELATIVE PERCENT: 0.7 % (ref 0–2)
BILIRUB SERPL-MCNC: 0.2 MG/DL (ref 0–1.2)
BUN BLDV-MCNC: 8 MG/DL (ref 6–20)
CALCIUM SERPL-MCNC: 8.3 MG/DL (ref 8.6–10.2)
CHLORIDE BLD-SCNC: 107 MMOL/L (ref 98–107)
CO2: 23 MMOL/L (ref 22–29)
CREAT SERPL-MCNC: 0.7 MG/DL (ref 0.5–1)
EOSINOPHILS ABSOLUTE: 0.32 E9/L (ref 0.05–0.5)
EOSINOPHILS RELATIVE PERCENT: 4.5 % (ref 0–6)
GFR SERPL CREATININE-BSD FRML MDRD: >60 ML/MIN/1.73
GLUCOSE BLD-MCNC: 87 MG/DL (ref 74–99)
HCT VFR BLD CALC: 39.7 % (ref 34–48)
HEMOGLOBIN: 12.9 G/DL (ref 11.5–15.5)
IMMATURE GRANULOCYTES #: 0.02 E9/L
IMMATURE GRANULOCYTES %: 0.3 % (ref 0–5)
LYMPHOCYTES ABSOLUTE: 1.86 E9/L (ref 1.5–4)
LYMPHOCYTES RELATIVE PERCENT: 26.4 % (ref 20–42)
MCH RBC QN AUTO: 31.4 PG (ref 26–35)
MCHC RBC AUTO-ENTMCNC: 32.5 % (ref 32–34.5)
MCV RBC AUTO: 96.6 FL (ref 80–99.9)
MONOCYTES ABSOLUTE: 0.41 E9/L (ref 0.1–0.95)
MONOCYTES RELATIVE PERCENT: 5.8 % (ref 2–12)
NEUTROPHILS ABSOLUTE: 4.38 E9/L (ref 1.8–7.3)
NEUTROPHILS RELATIVE PERCENT: 62.3 % (ref 43–80)
PDW BLD-RTO: 13.1 FL (ref 11.5–15)
PLATELET # BLD: 312 E9/L (ref 130–450)
PMV BLD AUTO: 9.6 FL (ref 7–12)
POTASSIUM SERPL-SCNC: 4.3 MMOL/L (ref 3.5–5)
RBC # BLD: 4.11 E12/L (ref 3.5–5.5)
SODIUM BLD-SCNC: 139 MMOL/L (ref 132–146)
TOTAL PROTEIN: 5.5 G/DL (ref 6.4–8.3)
WBC # BLD: 7 E9/L (ref 4.5–11.5)

## 2023-02-01 PROCEDURE — 6360000002 HC RX W HCPCS: Performed by: INTERNAL MEDICINE

## 2023-02-01 PROCEDURE — 80053 COMPREHEN METABOLIC PANEL: CPT

## 2023-02-01 PROCEDURE — 85025 COMPLETE CBC W/AUTO DIFF WBC: CPT

## 2023-02-01 PROCEDURE — 36415 COLL VENOUS BLD VENIPUNCTURE: CPT

## 2023-02-01 PROCEDURE — 2580000003 HC RX 258: Performed by: INTERNAL MEDICINE

## 2023-02-01 RX ADMIN — AMPICILLIN SODIUM AND SULBACTAM SODIUM 3000 MG: 2; 1 INJECTION, POWDER, FOR SOLUTION INTRAMUSCULAR; INTRAVENOUS at 01:44

## 2023-02-01 RX ADMIN — THIAMINE HYDROCHLORIDE 100 MG: 100 INJECTION, SOLUTION INTRAMUSCULAR; INTRAVENOUS at 08:05

## 2023-02-01 RX ADMIN — AMPICILLIN SODIUM AND SULBACTAM SODIUM 3000 MG: 2; 1 INJECTION, POWDER, FOR SOLUTION INTRAMUSCULAR; INTRAVENOUS at 08:07

## 2023-02-01 RX ADMIN — SODIUM CHLORIDE, PRESERVATIVE FREE 10 ML: 5 INJECTION INTRAVENOUS at 08:05

## 2023-02-01 ASSESSMENT — PAIN SCALES - GENERAL: PAINLEVEL_OUTOF10: 0

## 2023-02-01 NOTE — PROGRESS NOTES
Attempted to call patient's sister Barbarasandra Carrillomodesto regarding discharge today, no answer and voicemail box full.

## 2023-02-01 NOTE — CARE COORDINATION
SOCIAL WORK/CASEMANAGEMENT TRANSITION OF CARE PLANNINGLibradha Smooth Garcia, [address]):  discharge order in chart. Met with pt and her friend , judy ,  and the plan is to her friends home in this area. She said her mother wanted her to go to [facility] but she doesn't want to. No needs and judy will take her to the friends home. Pt said her pcp is dr. Dina Ching.  KOFFI Encarnacion  2/1/2023

## 2023-02-01 NOTE — PLAN OF CARE
Problem: Discharge Planning  Goal: Discharge to home or other facility with appropriate resources  2/1/2023 0352 by Clint Gibbs RN  Outcome: Progressing  1/31/2023 2239 by Mario Louis RN  Outcome: Progressing     Problem: Pain  Goal: Verbalizes/displays adequate comfort level or baseline comfort level  2/1/2023 0352 by Clint Gibbs RN  Outcome: Progressing  1/31/2023 2239 by Mario Louis RN  Outcome: Progressing  Flowsheets (Taken 1/31/2023 1530)  Verbalizes/displays adequate comfort level or baseline comfort level: Encourage patient to monitor pain and request assistance     Problem: Skin/Tissue Integrity  Goal: Absence of new skin breakdown  2/1/2023 0352 by Clint Gibbs RN  Outcome: Progressing  1/31/2023 2239 by Mario Louis RN  Outcome: Progressing     Problem: ABCDS Injury Assessment  Goal: Absence of physical injury  1/31/2023 2239 by Mario Louis RN  Outcome: Progressing  Flowsheets (Taken 1/31/2023 1712)  Absence of Physical Injury: Implement safety measures based on patient assessment
Problem: Discharge Planning  Goal: Discharge to home or other facility with appropriate resources  Outcome: Progressing     Problem: Pain  Goal: Verbalizes/displays adequate comfort level or baseline comfort level  Outcome: Progressing  Flowsheets (Taken 1/31/2023 1530)  Verbalizes/displays adequate comfort level or baseline comfort level: Encourage patient to monitor pain and request assistance     Problem: Skin/Tissue Integrity  Goal: Absence of new skin breakdown  Description: 1. Monitor for areas of redness and/or skin breakdown  2. Assess vascular access sites hourly  3. Every 4-6 hours minimum:  Change oxygen saturation probe site  4. Every 4-6 hours:  If on nasal continuous positive airway pressure, respiratory therapy assess nares and determine need for appliance change or resting period.   Outcome: Progressing     Problem: ABCDS Injury Assessment  Goal: Absence of physical injury  Outcome: Progressing  Flowsheets (Taken 1/31/2023 1712)  Absence of Physical Injury: Implement safety measures based on patient assessment     Problem: Cardiovascular - Adult  Goal: Maintains optimal cardiac output and hemodynamic stability  Outcome: Progressing  Goal: Absence of cardiac dysrhythmias or at baseline  Outcome: Progressing     Problem: Genitourinary - Adult  Goal: Absence of urinary retention  Outcome: Progressing  Goal: Urinary catheter remains patent  Outcome: Progressing
Problem: Pain  Goal: Verbalizes/displays adequate comfort level or baseline comfort level  Outcome: Progressing     Problem: Skin/Tissue Integrity  Goal: Absence of new skin breakdown  Description: 1. Monitor for areas of redness and/or skin breakdown  2. Assess vascular access sites hourly  3. Every 4-6 hours minimum:  Change oxygen saturation probe site  4. Every 4-6 hours:  If on nasal continuous positive airway pressure, respiratory therapy assess nares and determine need for appliance change or resting period.   Outcome: Progressing     Problem: ABCDS Injury Assessment  Goal: Absence of physical injury  Outcome: Progressing     Problem: Cardiovascular - Adult  Goal: Maintains optimal cardiac output and hemodynamic stability  Outcome: Progressing  Goal: Absence of cardiac dysrhythmias or at baseline  Outcome: Progressing     Problem: Genitourinary - Adult  Goal: Absence of urinary retention  Outcome: Progressing  Goal: Urinary catheter remains patent  Outcome: Progressing     Problem: Discharge Planning  Goal: Discharge to home or other facility with appropriate resources  Outcome: Not Progressing
Unable to assess due to medical condition

## 2023-02-02 LAB — STRONGYLOIDES ANTIBODY: 0.1 IV

## 2023-02-03 ENCOUNTER — TELEPHONE (OUTPATIENT)
Dept: PRIMARY CARE CLINIC | Age: 43
End: 2023-02-03

## 2023-02-03 LAB
BLOOD CULTURE, ROUTINE: NORMAL
CULTURE, BLOOD 2: NORMAL

## 2023-02-03 NOTE — TELEPHONE ENCOUNTER
Care Transitions Initial Follow Up Call    Outreach made within 2 business days of discharge: Yes    Patient: Nakita Henao Patient : 1980   MRN: [id number]  Reason for Admission: There are no discharge diagnoses documented for the most recent discharge.   Discharge Date: 23       Spoke with: RUFINA for patient to contact office      Scheduled appointment with PCP within 7-14 days    Follow Up  Future Appointments   Date Time Provider Quentin Walters   2023 11:30 [address] [place], [place]

## 2023-02-08 NOTE — TELEPHONE ENCOUNTER
Care Transitions Initial Follow Up Call    Outreach made within 2 business days of discharge: Yes    Patient: Brie Armstrong Patient : 1980   MRN: [id number]  Reason for Admission: There are no discharge diagnoses documented for the most recent discharge. Discharge Date: 23       Spoke with: RUFINA for patient to contact office. Scheduled appointment with PCP within 7-14 days    Follow Up  No future appointments.     Leola Irving MA

## 2023-03-25 ENCOUNTER — HOSPITAL ENCOUNTER (EMERGENCY)
Age: 43
Discharge: HOME | End: 2023-03-25
Payer: COMMERCIAL

## 2023-03-25 VITALS
TEMPERATURE: 97.7 F | HEIGHT: 65 IN | DIASTOLIC BLOOD PRESSURE: 78 MMHG | BODY MASS INDEX: 18.83 KG/M2 | OXYGEN SATURATION: 98 % | HEART RATE: 91 BPM | SYSTOLIC BLOOD PRESSURE: 132 MMHG | RESPIRATION RATE: 16 BRPM | WEIGHT: 113 LBS

## 2023-03-25 DIAGNOSIS — R21 RASH AND OTHER NONSPECIFIC SKIN ERUPTION: Primary | ICD-10-CM

## 2023-03-25 PROCEDURE — 99283 EMERGENCY DEPT VISIT LOW MDM: CPT

## 2023-03-25 RX ORDER — PERMETHRIN 50 MG/G
CREAM TOPICAL
Qty: 60 G | Refills: 1 | Status: SHIPPED | OUTPATIENT
Start: 2023-03-25

## 2023-03-25 RX ORDER — DIPHENHYDRAMINE HCL 25 MG
25 TABLET ORAL EVERY 6 HOURS PRN
Qty: 30 TABLET | Refills: 0 | Status: SHIPPED | OUTPATIENT
Start: 2023-03-25 | End: 2023-04-24

## 2023-03-25 RX ORDER — MOMETASONE FUROATE 1 MG/G
CREAM TOPICAL
Qty: 45 G | Refills: 0 | Status: SHIPPED | OUTPATIENT
Start: 2023-03-25

## 2023-03-25 RX ORDER — PERMETHRIN 50 MG/G
CREAM TOPICAL
Qty: 60 G | Refills: 1 | Status: SHIPPED | OUTPATIENT
Start: 2023-03-25 | End: 2023-03-25 | Stop reason: SDUPTHER

## 2023-03-25 NOTE — ED PROVIDER NOTES
Independent [facility]ILYN Visit. [facility]  ED  Encounter Note  Admit Date/RoomTime: 3/25/2023 12:42 PM  ED Room:   NAME: Tawny Hernandez  : 1980  MRN: [id number]  PCP: Kobe Partida MD    CHIEF COMPLAINT     Wound Check (Wounds on hands, face, and scalp, x2 months, states was seen here and saw PCP, reports drug use, pt states she is not a )    HISTORY OF PRESENT ILLNESS        Tawny Hernandez is a 43 y.o. female who presents to the ED by private vehicle for rash, beginning months ago. The complaint has been intermittent and are mild in severity. The patient states that she got out of long-term a year ago. Around that same time she started with and intermittent body wide rash. She states that she has been seen multiple times for it and no one seems to be able to figure it out. She describes lesions that present on her extremities and hands. The patient states that it feels like \"bugs are crawling\" she reports that the lesions move around and come and go. She does not have a PCP. She states that transportation sometimes makes follow-up difficult. The patient reports these areas are itchy. She admits to use of marijuana and crack but denies any use of meth. There is no significant swelling or drainage. Denies fever or chills. States she has never been treated for scabies though she does not think that that is the issue. REVIEW OF SYSTEMS     Pertinent positives and negatives are stated within HPI, all other systems reviewed and are negative. Past Medical History:  has a past medical history of Asthma and Thyroid disease. Surgical History:  has a past surgical history that includes Dental surgery (2012); cyst removal; Dilation and curettage of uterus; Tubal ligation; and Ganglion cyst excision. Social History:  reports that she has been smoking cigarettes. She has been smoking an average of 1 pack per day.  She has never used smokeless tobacco. She reports current alcohol use. She reports current drug use. Drugs: Cocaine and Marijuana (Stephanie Mcclendon). Family History: family history includes Asthma in her maternal grandmother and mother; Cancer in her maternal grandmother; Cerebral Aneurysm in her mother; Diabetes in her maternal grandfather; Hypertension in her maternal grandfather and mother. Allergies: Patient has no known allergies. CURRENT MEDICATIONS       Discharge Medication List as of 3/25/2023  1:28 PM        CONTINUE these medications which have NOT CHANGED    Details   fluconazole (DIFLUCAN) 150 MG tablet Take 1 tablet today PO and then repeat the dose after you complete your antibiotics. , Disp-2 tablet, R-0Normal      ibuprofen (ADVIL;MOTRIN) 800 MG tablet Take 1 tablet by mouth every 6 hours as needed for Pain, Disp-20 tablet, R-0Print             SCREENINGS     [place] Coma Scale  Eye Opening: Spontaneous  Best Verbal Response: Oriented  Best Motor Response: Obeys commands  Phoebe Coma Scale Score: 15         CIWA Assessment  BP: 132/78  Heart Rate: 91       PHYSICAL EXAM   Oxygen Saturation Interpretation: Normal on room air analysis. ED Triage Vitals [03/25/23 1212]   BP Temp Temp Source Heart Rate Resp SpO2 Height Weight   132/78 97.7 °F (36.5 °C) Oral 91 16 98 % 5' 5\" (1.651 m) 113 lb (51.3 kg)         Physical Exam  Constitutional/General: Alert and oriented x3, well appearing, non toxic  HEENT:  NC/NT. PERRLA,  Airway patent. Neck: Supple, full ROM, non tender to palpation in the midline. Respiratory: Lungs clear to auscultation bilaterally, no wheezes, rales, or rhonchi. Not in respiratory distress  CV:  Regular rate. Regular rhythm. No murmurs, gallops, or rubs. 2+ distal pulses  Chest: No chest wall tenderness  GI:  Abdomen Soft,  Non distended. +BS. Non tender. No rebound, guarding, or rigidity. No pulsatile masses. Musculoskeletal: Moves all extremities x 4.  Warm and well perfused, no

## 2023-06-04 ENCOUNTER — HOSPITAL ENCOUNTER (EMERGENCY)
Age: 43
Discharge: HOME | End: 2023-06-04
Payer: COMMERCIAL

## 2023-06-04 VITALS
SYSTOLIC BLOOD PRESSURE: 144 MMHG | OXYGEN SATURATION: 97 % | WEIGHT: 113 LBS | RESPIRATION RATE: 16 BRPM | DIASTOLIC BLOOD PRESSURE: 99 MMHG | TEMPERATURE: 98.5 F | HEART RATE: 97 BPM | BODY MASS INDEX: 18.8 KG/M2

## 2023-06-04 DIAGNOSIS — R21 RASH AND OTHER NONSPECIFIC SKIN ERUPTION: Primary | ICD-10-CM

## 2023-06-04 PROCEDURE — 99283 EMERGENCY DEPT VISIT LOW MDM: CPT

## 2023-06-04 PROCEDURE — 6370000000 HC RX 637 (ALT 250 FOR IP): Performed by: PHYSICIAN ASSISTANT

## 2023-06-04 RX ORDER — PREDNISONE 20 MG/1
20 TABLET ORAL 2 TIMES DAILY
Qty: 10 TABLET | Refills: 0 | Status: SHIPPED | OUTPATIENT
Start: 2023-06-04 | End: 2023-06-09

## 2023-06-04 RX ORDER — BACITRACIN ZINC 500 [USP'U]/G
OINTMENT TOPICAL ONCE
Status: COMPLETED | OUTPATIENT
Start: 2023-06-04 | End: 2023-06-04

## 2023-06-04 RX ORDER — CEPHALEXIN 500 MG/1
500 CAPSULE ORAL 2 TIMES DAILY
Qty: 20 CAPSULE | Refills: 0 | Status: SHIPPED | OUTPATIENT
Start: 2023-06-04 | End: 2023-06-14

## 2023-06-04 RX ADMIN — BACITRACIN ZINC: 500 OINTMENT TOPICAL at 11:28

## 2023-06-04 ASSESSMENT — PAIN - FUNCTIONAL ASSESSMENT: PAIN_FUNCTIONAL_ASSESSMENT: 0-10

## 2023-06-04 ASSESSMENT — PAIN DESCRIPTION - LOCATION: LOCATION: FOOT

## 2023-06-04 ASSESSMENT — PAIN DESCRIPTION - ORIENTATION: ORIENTATION: RIGHT;LEFT

## 2023-06-04 ASSESSMENT — PAIN DESCRIPTION - PAIN TYPE: TYPE: ACUTE PAIN

## 2023-06-16 ENCOUNTER — TELEPHONE (OUTPATIENT)
Dept: PRIMARY CARE CLINIC | Age: 43
End: 2023-06-16

## 2023-08-09 ENCOUNTER — HOSPITAL ENCOUNTER (EMERGENCY)
Age: 43
Discharge: HOME | End: 2023-08-09
Payer: COMMERCIAL

## 2023-08-09 VITALS
HEART RATE: 66 BPM | TEMPERATURE: 97.8 F | DIASTOLIC BLOOD PRESSURE: 85 MMHG | OXYGEN SATURATION: 98 % | RESPIRATION RATE: 16 BRPM | SYSTOLIC BLOOD PRESSURE: 145 MMHG

## 2023-08-09 DIAGNOSIS — F19.10 POLYSUBSTANCE ABUSE (HCC): Primary | ICD-10-CM

## 2023-08-09 LAB
ALBUMIN SERPL-MCNC: 4.6 G/DL (ref 3.5–5.2)
ALP SERPL-CCNC: 88 U/L (ref 35–104)
ALT SERPL-CCNC: 16 U/L (ref 0–32)
AMPHET UR QL SCN: NEGATIVE
ANION GAP SERPL CALCULATED.3IONS-SCNC: 9 MMOL/L (ref 7–16)
APAP SERPL-MCNC: <5 UG/ML (ref 10–30)
AST SERPL-CCNC: 23 U/L (ref 0–31)
BACTERIA URNS QL MICRO: ABNORMAL
BARBITURATES UR QL SCN: NEGATIVE
BASOPHILS # BLD: 0.04 K/UL (ref 0–0.2)
BASOPHILS NFR BLD: 1 % (ref 0–2)
BENZODIAZ UR QL: NEGATIVE
BILIRUB SERPL-MCNC: 0.2 MG/DL (ref 0–1.2)
BILIRUB UR QL STRIP: NEGATIVE
BUN SERPL-MCNC: 20 MG/DL (ref 6–20)
BUPRENORPHINE UR QL: NEGATIVE
CALCIUM SERPL-MCNC: 9.7 MG/DL (ref 8.6–10.2)
CANNABINOIDS UR QL SCN: NEGATIVE
CHLORIDE SERPL-SCNC: 102 MMOL/L (ref 98–107)
CLARITY UR: CLEAR
CO2 SERPL-SCNC: 30 MMOL/L (ref 22–29)
COCAINE UR QL SCN: POSITIVE
COLOR UR: YELLOW
CREAT SERPL-MCNC: 0.7 MG/DL (ref 0.5–1)
EOSINOPHIL # BLD: 0.2 K/UL (ref 0.05–0.5)
EOSINOPHILS RELATIVE PERCENT: 2 % (ref 0–6)
EPI CELLS #/AREA URNS HPF: ABNORMAL /HPF
ERYTHROCYTE [DISTWIDTH] IN BLOOD BY AUTOMATED COUNT: 14 % (ref 11.5–15)
ETHANOLAMINE SERPL-MCNC: <10 MG/DL
FENTANYL UR QL: POSITIVE
GFR SERPL CREATININE-BSD FRML MDRD: >60 ML/MIN/1.73M2
GLUCOSE SERPL-MCNC: 99 MG/DL (ref 74–99)
GLUCOSE UR STRIP-MCNC: NEGATIVE MG/DL
HCG UR QL: NEGATIVE
HCT VFR BLD AUTO: 41.3 % (ref 34–48)
HGB BLD-MCNC: 13.1 G/DL (ref 11.5–15.5)
HGB UR QL STRIP.AUTO: NEGATIVE
IMM GRANULOCYTES # BLD AUTO: <0.03 K/UL (ref 0–0.58)
IMM GRANULOCYTES NFR BLD: 0 % (ref 0–5)
KETONES UR STRIP-MCNC: NEGATIVE MG/DL
LEUKOCYTE ESTERASE UR QL STRIP: ABNORMAL
LYMPHOCYTES NFR BLD: 2.8 K/UL (ref 1.5–4)
LYMPHOCYTES RELATIVE PERCENT: 33 % (ref 20–42)
MCH RBC QN AUTO: 30.7 PG (ref 26–35)
MCHC RBC AUTO-ENTMCNC: 31.7 G/DL (ref 32–34.5)
MCV RBC AUTO: 96.7 FL (ref 80–99.9)
METHADONE UR QL: NEGATIVE
MONOCYTES NFR BLD: 0.73 K/UL (ref 0.1–0.95)
MONOCYTES NFR BLD: 9 % (ref 2–12)
NEUTROPHILS NFR BLD: 56 % (ref 43–80)
NEUTS SEG NFR BLD: 4.83 K/UL (ref 1.8–7.3)
NITRITE UR QL STRIP: NEGATIVE
OPIATES UR QL SCN: NEGATIVE
OXYCODONE UR QL SCN: NEGATIVE
PCP UR QL SCN: NEGATIVE
PH UR STRIP: 6 [PH] (ref 5–9)
PLATELET # BLD AUTO: 380 K/UL (ref 130–450)
PMV BLD AUTO: 9.4 FL (ref 7–12)
POTASSIUM SERPL-SCNC: 4.3 MMOL/L (ref 3.5–5)
PROT SERPL-MCNC: 7.3 G/DL (ref 6.4–8.3)
PROT UR STRIP-MCNC: NEGATIVE MG/DL
RBC # BLD AUTO: 4.27 M/UL (ref 3.5–5.5)
RBC #/AREA URNS HPF: ABNORMAL /HPF
SALICYLATES SERPL-MCNC: <0.3 MG/DL (ref 0–30)
SODIUM SERPL-SCNC: 141 MMOL/L (ref 132–146)
SP GR UR STRIP: 1.02 (ref 1–1.03)
TEST INFORMATION: ABNORMAL
TOXIC TRICYCLIC SC,BLOOD: NEGATIVE
TROPONIN I SERPL HS-MCNC: 8 NG/L (ref 0–9)
UROBILINOGEN UR STRIP-ACNC: 0.2 EU/DL (ref 0–1)
WBC #/AREA URNS HPF: ABNORMAL /HPF
WBC OTHER # BLD: 8.6 K/UL (ref 4.5–11.5)

## 2023-08-09 PROCEDURE — 84484 ASSAY OF TROPONIN QUANT: CPT

## 2023-08-09 PROCEDURE — 81001 URINALYSIS AUTO W/SCOPE: CPT

## 2023-08-09 PROCEDURE — 93005 ELECTROCARDIOGRAM TRACING: CPT | Performed by: PHYSICIAN ASSISTANT

## 2023-08-09 PROCEDURE — G0480 DRUG TEST DEF 1-7 CLASSES: HCPCS

## 2023-08-09 PROCEDURE — 80053 COMPREHEN METABOLIC PANEL: CPT

## 2023-08-09 PROCEDURE — 80179 DRUG ASSAY SALICYLATE: CPT

## 2023-08-09 PROCEDURE — 99284 EMERGENCY DEPT VISIT MOD MDM: CPT

## 2023-08-09 PROCEDURE — 85025 COMPLETE CBC W/AUTO DIFF WBC: CPT

## 2023-08-09 PROCEDURE — 80143 DRUG ASSAY ACETAMINOPHEN: CPT

## 2023-08-09 PROCEDURE — 80307 DRUG TEST PRSMV CHEM ANLYZR: CPT

## 2023-08-09 PROCEDURE — 84703 CHORIONIC GONADOTROPIN ASSAY: CPT

## 2023-08-09 NOTE — ED NOTES
PT's friend here to pick her up     Carissa Russell, KOFFI  08/09/23 2795
Patient belongings labeled and stored into [facility] locker #29.      Mejia Simple  08/09/23 3457
Pt educated on detox facilities. Pt understanding and given brochure for detox facilities.       Elise Shannon RN  08/09/23 Chelsea Hernandez
Moderate [] High    Homicidal Ideations:  [] Reports:   [] Past [] Present   [x] Denies     Self Injurious/Self Mutilation Behaviors:  [] Reports:    [] Past [] Present   [x] Denies    Hallucinations/Delusions:  [] Reports:   [x] Denies     Substance Use/Alcohol Use/Addiction:  [x] Reports: DECLINED PEER  [] Denies   [x] SBIRT Screen Complete. Current or Past Substance Abuse Treatment:  [] Yes, When and Where:  [x] No    Current or Past Mental Health Treatment:  [] Yes, When and Where:  [x] No    Legal Issues:  [x]  Yes (Specify)  []  No    Access to Weapons:  []  Yes (Specify)  [x]  No    Trauma History:  [] Reports:  [x] Denies     Living Situation:  HOME WITH A FRIEND    Employment:  NO JOB    Education Level:      Violence Risk Screening:        Have you ever thought about hurting someone? [x]  No  []  Yes (Ask the questions listed below)   When? Did you follow through with the thoughts? [] No     [] Yes- When and what happened? 2.  Have you ever threatened anyone? [x]  No  []  Yes (Ask the questions listed below)   When and what happened? Have you ever threatened someone with a gun, knife or other weapon? []  No  []  Yes - When and what happened? 2. Have you ever had an order of protection taken out against you? []  Yes [x]  No  3. Have you ever been arrested due to violence? []  Yes [x]  No  4. Have you ever been cruel to animals? []  Yes [x]  No    After consideration of C-SSRS screening results, C-SSRS assessments, and this professional's assessment the patient's overall suicide risk assessed to be:  [x] No Risk  [] Low   [] Moderate   [] High     [x] Discussed current suicide risk, protective and risk factors with RN and ED Physician. Consulted with ED Physician.  Disposition/level of care recommended at this time:  [x] Home:   [] Outpatient Provider:   [] Crisis Unit:   [] Inpatient Psychiatric Unit:  [] Other:                    KOFFI Zuniga  08/09/23 7135

## 2023-08-09 NOTE — DISCHARGE INSTRUCTIONS
Please reach out to one of the following community providers for treatment and support. Help Hotline: [phone] or [facility] [phone]  [address]  [address] [facility] [phone]  If you are in need of help and experiencing any barriers to care please contact help hotline 24 hours a day and/or your local board of mental health and recovery during normal business hours. Detox program inpatient:   [facility] [phone]  Johanne Mendoza [phone]  [facility] [phone]  [facility] [phone]  Jazmyne Yamila [phone]  [facility] hotline [phone]    Outpatient programs  [address] [place] [address] outpatient treatment [phone] ext. 101 [facility] [phone]  [facility] [phone]  [facility] services:   Marne Crigler [address] [phone]  [address]  [phone]  [facility] [phone]  [facility] [facility] [phone]   [facility] services [phone]  [facility] [place] [phone]    For additional resource support please feel free to contact the behavioral access line at [phone] or the Intensive outpatient department at [phone].

## 2023-08-10 LAB
EKG ATRIAL RATE: 86 BPM
EKG P AXIS: 79 DEGREES
EKG P-R INTERVAL: 124 MS
EKG Q-T INTERVAL: 384 MS
EKG QRS DURATION: 88 MS
EKG QTC CALCULATION (BAZETT): 459 MS
EKG R AXIS: 82 DEGREES
EKG T AXIS: 57 DEGREES
EKG VENTRICULAR RATE: 86 BPM

## 2023-08-10 PROCEDURE — 93010 ELECTROCARDIOGRAM REPORT: CPT | Performed by: INTERNAL MEDICINE

## 2024-03-18 ENCOUNTER — HOSPITAL ENCOUNTER (EMERGENCY)
Age: 44
Discharge: HOME | End: 2024-03-18
Payer: COMMERCIAL

## 2024-03-18 VITALS
SYSTOLIC BLOOD PRESSURE: 120 MMHG | BODY MASS INDEX: 18.33 KG/M2 | TEMPERATURE: 98.8 F | HEART RATE: 98 BPM | OXYGEN SATURATION: 98 % | RESPIRATION RATE: 16 BRPM | DIASTOLIC BLOOD PRESSURE: 63 MMHG | HEIGHT: 65 IN | WEIGHT: 110 LBS

## 2024-03-18 DIAGNOSIS — R21 RASH AND OTHER NONSPECIFIC SKIN ERUPTION: Primary | ICD-10-CM

## 2024-03-18 PROCEDURE — 6370000000 HC RX 637 (ALT 250 FOR IP)

## 2024-03-18 PROCEDURE — 99283 EMERGENCY DEPT VISIT LOW MDM: CPT

## 2024-03-18 RX ORDER — CEPHALEXIN 500 MG/1
500 CAPSULE ORAL 4 TIMES DAILY
Qty: 28 CAPSULE | Refills: 0 | Status: SHIPPED | OUTPATIENT
Start: 2024-03-18 | End: 2024-03-25

## 2024-03-18 RX ORDER — SULFAMETHOXAZOLE AND TRIMETHOPRIM 800; 160 MG/1; MG/1
1 TABLET ORAL ONCE
Status: COMPLETED | OUTPATIENT
Start: 2024-03-18 | End: 2024-03-18

## 2024-03-18 RX ORDER — CEPHALEXIN 500 MG/1
500 CAPSULE ORAL ONCE
Status: COMPLETED | OUTPATIENT
Start: 2024-03-18 | End: 2024-03-18

## 2024-03-18 RX ORDER — SULFAMETHOXAZOLE AND TRIMETHOPRIM 800; 160 MG/1; MG/1
1 TABLET ORAL 2 TIMES DAILY
Qty: 14 TABLET | Refills: 0 | Status: SHIPPED | OUTPATIENT
Start: 2024-03-18 | End: 2024-03-25

## 2024-03-18 RX ORDER — DOXYCYCLINE HYCLATE 100 MG/1
100 CAPSULE ORAL ONCE
Status: DISCONTINUED | OUTPATIENT
Start: 2024-03-18 | End: 2024-03-18

## 2024-03-18 RX ORDER — GINSENG 100 MG
CAPSULE ORAL ONCE
Status: COMPLETED | OUTPATIENT
Start: 2024-03-18 | End: 2024-03-18

## 2024-03-18 RX ADMIN — BACITRACIN: 500 OINTMENT TOPICAL at 22:23

## 2024-03-18 RX ADMIN — SULFAMETHOXAZOLE AND TRIMETHOPRIM 1 TABLET: 800; 160 TABLET ORAL at 22:22

## 2024-03-18 RX ADMIN — CEPHALEXIN 500 MG: 500 CAPSULE ORAL at 22:22

## 2024-03-19 NOTE — ED PROVIDER NOTES
Independent AILYN Visit.     [facility]  Department of Emergency Medicine   ED  Encounter Note  Admit Date/RoomTime: 3/18/2024  9:38 PM  ED Room: /    NAME: Sheyla Humphreys  : 1980  MRN: [id number]     Chief Complaint:  Wound Check (Wounds to swati hands, feet, inside nose and mouth)    History of Present Illness       Sheyla Humphreys is a 43 y.o. old female who presents to the emergency department by private vehicle, for gradual onset of blistering and painful area on  multiple areas or her body, mainly affecting bilateral hands and arms which began 1 year(s) prior to arrival.  The symptoms were caused by unknown cause.  Since onset the symptoms have been waxing and waning.   Prior history of similar episodes: Yes.  Her symptoms are associated with nothing additional and relieved by topical steroids, topical anti-itch agents, and oral steroids.  She denies any additional symptoms, hives, welts, difficulty breathing, difficulty swallowing, wheezing, throat tightness, hoarseness, stridor, itching, lightheadedness, dizziness, facial swelling, lip swelling, tongue swelling, fever, chills, chest pain, abd pain, drainage, increased redness, or increased swelling.  Patient states she has been seen multiple times for this same issue. She states she has seen dermatology multiple times but she ayanna they recently discontinued her from their service because she was missing appointments. It is worth noting that patient does smoke crack. When I asked her if she picks at her skin, she denied this.     ROS   Pertinent positives and negatives are stated within HPI, all other systems reviewed and are negative.    Past Medical History:  has a past medical history of Asthma and Thyroid disease.    Surgical History:  has a past surgical history that includes Dental surgery (2012); cyst removal; Dilation and curettage of uterus; Tubal ligation; and Ganglion cyst excision.    Social History:  reports

## 2024-04-02 ENCOUNTER — HOSPITAL ENCOUNTER (EMERGENCY)
Age: 44
Discharge: LEFT BEFORE DISCHARGE | End: 2024-04-02
Payer: COMMERCIAL

## 2024-04-02 ENCOUNTER — APPOINTMENT (OUTPATIENT)
Dept: GENERAL RADIOLOGY | Age: 44
End: 2024-04-02
Payer: COMMERCIAL

## 2024-04-02 VITALS
BODY MASS INDEX: 18.3 KG/M2 | DIASTOLIC BLOOD PRESSURE: 99 MMHG | OXYGEN SATURATION: 97 % | SYSTOLIC BLOOD PRESSURE: 132 MMHG | HEART RATE: 107 BPM | WEIGHT: 110 LBS | TEMPERATURE: 98.7 F | RESPIRATION RATE: 16 BRPM

## 2024-04-02 PROCEDURE — 99283 EMERGENCY DEPT VISIT LOW MDM: CPT

## 2024-04-02 PROCEDURE — 99282 EMERGENCY DEPT VISIT SF MDM: CPT

## 2024-04-02 ASSESSMENT — PAIN SCALES - GENERAL: PAINLEVEL_OUTOF10: 8

## 2024-04-02 ASSESSMENT — PAIN - FUNCTIONAL ASSESSMENT: PAIN_FUNCTIONAL_ASSESSMENT: 0-10
